# Patient Record
Sex: MALE | Race: WHITE | NOT HISPANIC OR LATINO | Employment: OTHER | URBAN - METROPOLITAN AREA
[De-identification: names, ages, dates, MRNs, and addresses within clinical notes are randomized per-mention and may not be internally consistent; named-entity substitution may affect disease eponyms.]

---

## 2017-01-31 ENCOUNTER — ALLSCRIPTS OFFICE VISIT (OUTPATIENT)
Dept: OTHER | Facility: OTHER | Age: 82
End: 2017-01-31

## 2017-02-19 ENCOUNTER — GENERIC CONVERSION - ENCOUNTER (OUTPATIENT)
Dept: OTHER | Facility: OTHER | Age: 82
End: 2017-02-19

## 2017-04-28 ENCOUNTER — ALLSCRIPTS OFFICE VISIT (OUTPATIENT)
Dept: OTHER | Facility: OTHER | Age: 82
End: 2017-04-28

## 2017-05-03 ENCOUNTER — ALLSCRIPTS OFFICE VISIT (OUTPATIENT)
Dept: OTHER | Facility: OTHER | Age: 82
End: 2017-05-03

## 2017-08-04 ENCOUNTER — ALLSCRIPTS OFFICE VISIT (OUTPATIENT)
Dept: OTHER | Facility: OTHER | Age: 82
End: 2017-08-04

## 2017-10-30 ENCOUNTER — ALLSCRIPTS OFFICE VISIT (OUTPATIENT)
Dept: OTHER | Facility: OTHER | Age: 82
End: 2017-10-30

## 2017-10-31 NOTE — PROGRESS NOTES
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)   4  Paroxysmal atrial fibrillation (427 31) (I48 0)   5  SVT (supraventricular tachycardia) (427 89) (I47 1)   6  Dizziness (780 4) (R42)    Plan  Hypercholesterolemia    · Simvastatin 20 MG Oral Tablet; TAKE 1 TABLET DAILY AT BEDTIME   Rx By: Cayla Ramos; Dispense: 90 Days ; #:90 Tablet; Refill: 3;For: Hypercholesterolemia; MARTELL = N; Sent To: [facility] Hypercholesterolemia, Hypertension, SVT (supraventricular tachycardia)    · Follow-up visit in 6 months Evaluation and Treatment  Follow-up  Status: Hold For -  Scheduling  Requested for: 30Apr2018   Ordered; For: Hypercholesterolemia, Hypertension, SVT (supraventricular tachycardia); Ordered By: Cayla Ramos Performed:  Due: 38FCX0268    Discussion/Summary  Cardiology Discussion Summary Free Text Note Form Marton Halsted: It is my impression that the patient has vague lightheadedness  His last interrogation in August did not suggest much in the way of prolonged SVT to explain this  His blood pressure today was quite good but he has had some higher readings at home  It would be hard to implicate low blood pressure to cause this symptom  At this point I did not alter his antihypertensive regimen since his blood pressure was fairly favorable today he will continue low-dose metoprolol along with Cardura 2 mg daily  He does have an upcoming interrogation and we will see if he is having prolonged episodes of tachycardia  If this would be the case I might consider switching his metoprolol to diltiazem which may be a better drug for the SVT  Gilles Nance His valves appear to be working fine and his exam is unremarkable  He is not having much in the way of atrial fibrillation status post pulmonary vein isolation  He also has LA ligation  He is taking aspirin since he had some issues with warfarin and excessive bruising   His pacemaker appears to be functioning fine  His lipids were reviewed and were excellent earlier this year  I will see him again in 6 months time  Chief Complaint  Chief Complaint Free Text Note Form: 6 month FU for AS/MR and thoracic aortic aneurysm status post AVR/MVR and aortic root surgery in February of 2013  Sara Taylor Also has PAF-not on warfarin, hypertension, hyperlipidemia, SVT and permanent pacemaker in situ for sick sinus syndrome   Chief Complaint Chronic Condition [facility]: Patient is here today for follow up of chronic conditions described in HPI  History of Present Illness  Cardiology HPI Free Text Note Form [facility]: The patient has had some elevated BPs lately  He is having HAs  He feels unsteady at times which he attributes to [facility]    ON interrogations (last in August) he had only brief SVT  He denies shortness of breath or chest pain  He denies syncope  He does note slight LE edema  He denies palpitations  Review of Systems  Cardiology Male ROS:     Cardiac: rhythm problems-- and-- has swelling in the LEs, but-- no chest pain,-- no fainting/blackouts,-- no heart murmur present,-- no palpitations present,-- no syncope/fainting,-- no AM fatigue-- and-- no witnessed apnea episodes  Skin: No complaints of nonhealing sores or skin rash  Genitourinary: frequent urination at night, but-- no recurrent urinary tract infections,-- no difficult urination,-- no blood in urine,-- no kidney stones,-- no loss of bladder control,-- no kidney problems,-- no prostate problems-- and-- no erectile dysfunction   Psychological: depression,-- anxiety-- and-- difficulty concentrating, but-- no panic attacks-- and-- no palpitations present  General: lack of energy/fatigue, but-- no trouble sleeping,-- no appetite changes,-- no changes in weight,-- no fever,-- no night sweats-- and-- no frequent infections  Respiratory: No complaints of shortness of breath, cough with sputum, or wheezing     HEENT: no serious eye problems,-- no throat problems-- and-- no snoring   Gastrointestinal: No complaints of liver problems, nausea, vomiting, heartburn, constipation, bloody stools, diarrhea, problems swallowing, adbominal pain, or rectal bleeding  Hematologic: No complaints of bleeding disorders, anemia, blood clots, or excessive brusing  Neurological: headaches-- and-- dizziness, but-- no numbness,-- no tingling,-- no weakness,-- no seizures,-- no diplopia-- and-- no daytime sleepiness-- lightheadedness  Musculoskeletal: arthritis-- and-- back pain, but-- no swelling/pain   ROS Reviewed:   ROS reviewed  Active Problems  Problems    1  Anticoagulant long-term use (V58 61) (Z79 01)   2  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)   3  Aortic aneurysm (441 9) (I71 9)   4  Aortic Valve Replacement   5  Benign prostatic hypertrophy (600 00) (N40 0)   6  Esophageal reflux (530 81) (K21 9)   7  Hypercholesterolemia (272 0) (E78 00)   8  Hypertension (401 9) (I10)   9  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)   10  Mitral regurgitation (424 0) (I34 0)   11  Pancreatic cyst (577 2) (K86 2)   12  Paroxysmal atrial fibrillation (427 31) (I48 0)   13  S/P mitral valve replacement (V43 3) (Z95 2)   14  SVT (supraventricular tachycardia) (427 89) (I47 1)   15  Valvular Heart Disease (396 9)    Past Medical History  Problems    1  History of atrial fibrillation (V12 59) (Z86 79)   2  History of gastroesophageal reflux (GERD) (V12 79) (Z87 19)   3  History of hiatal hernia (V12 79) (Z87 19)   4  History of hypercholesterolemia (V12 29) (Z86 39)   5  History of hypertension (V12 59) (Z86 79)   6  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)  Active Problems And Past Medical History Reviewed: The active problems and past medical history were reviewed and updated today  Surgical History  Problems    1  Aortic Valve Replacement   2  History of Ascending Aorta Graft With Valve Replacement   3   History of Cataract Surgery   4  History of Excision Of Lesion Ears Malignant   5  History of Mitral Valve Replacement   6  History of Pacemaker Placement   7  History of Shoulder Repair  Surgical History Reviewed: The surgical history was reviewed and updated today  Family History  Mother    1  No pertinent family history  Father    2  No pertinent family history  Family History    3  Family history of Acute Myocardial Infarction (V17 3)   4  Family history of Heart Disease (V17 49)  Family History Reviewed: The family history was reviewed and updated today  Social History  Problems    · Denied: History of Alcohol Use (History)   · Denied: History of Drug Use   · Former smoker (R44 92) (J34 533)  Social History Reviewed: The social history was reviewed and updated today  The social history was reviewed and is unchanged  Current Meds   1  Allopurinol 300 MG Oral Tablet; 1 tablet 3 times weekly; Therapy: (Recorded:28Apr2017) to Recorded   2  Aspirin EC Low Dose 81 MG Oral Tablet Delayed Release; Take 1 tablet daily Recorded   3  Cardura 4 MG Oral Tablet; TAKE 1/2 TABLET DAILY; Therapy: (Recorded:06Pti1055) to Recorded   4  Cosamin -400 MG Oral Tablet; TAKE AS DIRECTED; Therapy: (Recorded:10Ybl5739) to Recorded   5  Fish Oil 1000 MG Oral Capsule; Take 1 capsule twice daily; Therapy: (Joseline Draft) to Recorded   6  LORazepam 0 5 MG Oral Tablet; TAKE 1 TABLET TWICE DAILY; Therapy: 13NJD9303 to (Evaluate:18Feb2017); Last Rx:21Oct2016 Ordered   7  Metoprolol Succinate ER 25 MG Oral Tablet Extended Release 24 Hour; Take 1 tablet   twice daily  Requested for: 75Abl4072; Last Rx:31Plb6468 Ordered   8  Multi-Vitamin TABS; TAKE 1 TABLET DAILY; Therapy: (Joseline Draft) to Recorded   9  Pepcid AC 10 MG Oral Tablet; Take 1 tablet twice daily Recorded   10  Simvastatin 20 MG Oral Tablet; TAKE 1 TABLET DAILY AT BEDTIME;     Therapy: 10Bhx2916 to (Evaluate:19Gnd8894)  Requested for: 01Fjk2360; Last    Rx:46Wlp2926 Ordered  Medication List Reviewed: The medication list was reviewed and updated today  Allergies  Medication    1  No Known Drug Allergies    Vitals  Vital Signs    Recorded: 03JAF1171 08:46AM   Heart Rate 84   Systolic 007   Diastolic 80   Weight 529 lb 8 oz   BMI Calculated 25 58   BSA Calculated 1 81     Physical Exam    Constitutional   General appearance: No acute distress, well appearing and well nourished  Eyes   Conjunctiva and Sclera examination: Conjunctiva pink, sclera anicteric  Ears, Nose, Mouth, and Throat - Oropharynx: Clear, nares are clear, mucous membranes are moist    Neck   Neck and thyroid: Normal, supple, trachea midline, no thyromegaly  Pulmonary   Auscultation of lungs: Clear to auscultation, no rales, no rhonchi, no wheezing, good air movement  Cardiovascular   Auscultation of heart: Abnormal  -- Regular with premature beats  Grade 1-2 systolic murmur at base   no diastolic murmur rub or gallop  Carotid pulses: Normal, 2+ bilaterally  Pedal pulses: Normal, 2+ bilaterally  Examination of extremities for edema and/or varicosities: Normal     Abdomen   Abdomen: Non-tender and no distention  Liver and spleen: No hepatomegaly or splenomegaly  Musculoskeletal Gait and station: Normal gait  -- Digits and nails: Normal without clubbing or cyanosis  -- Inspection/palpation of joints, bones, and muscles: Abnormal -- kyphosis  Skin - Skin and subcutaneous tissue: Normal without rashes or lesions  Skin is warm and well perfused, normal turgor  Neurologic - Cranial nerves: II - XII intact  -- [place]  -- Sensation: No sensory loss      Psychiatric - Orientation to person, place, and time: Normal -- Mood and affect: Normal       Future Appointments    Date/Time Provider Specialty Site   11/07/2017 01:00 PM Cardiology, [employer] [place]   02/08/2018 08:00 AM [address]iology[address] [place] 05/09/2018 09:30 AM Cardiology, Device Remote   [place]     Signatures   Electronically signed by : FABRICE Mauricio ; Oct 30 2017  9:34AM EST                       (Author)

## 2017-11-07 ENCOUNTER — ALLSCRIPTS OFFICE VISIT (OUTPATIENT)
Dept: OTHER | Facility: OTHER | Age: 82
End: 2017-11-07

## 2018-01-13 VITALS
DIASTOLIC BLOOD PRESSURE: 60 MMHG | RESPIRATION RATE: 16 BRPM | WEIGHT: 160 LBS | HEART RATE: 78 BPM | BODY MASS INDEX: 25.71 KG/M2 | HEIGHT: 66 IN | SYSTOLIC BLOOD PRESSURE: 132 MMHG

## 2018-01-14 VITALS
HEART RATE: 84 BPM | BODY MASS INDEX: 25.58 KG/M2 | WEIGHT: 158.5 LBS | SYSTOLIC BLOOD PRESSURE: 142 MMHG | DIASTOLIC BLOOD PRESSURE: 80 MMHG

## 2018-01-16 NOTE — RESULT NOTES
Message  Patient call back with results of pro-BNP  Levels  Do not suggest overt CHF  Recent device check, as well as recent echocardiogram within normal limits  Suspect pulmonary origin and patient does admit that symptoms are similar to previous bronchitis  Patient states he will be seen primary care physician tomorrow and I advised him to have him evaluate his lungs and possibly obtain a chest x-ray  No change in medication currently   Patient will call if symptoms persist or worsen, but do not appear to be cardiac at this time      Signatures   Electronically signed by : Virginia Kidd DO; Aug 18 2016  4:28PM EST                       (Author)

## 2018-02-08 ENCOUNTER — CLINICAL SUPPORT (OUTPATIENT)
Dept: CARDIOLOGY CLINIC | Facility: CLINIC | Age: 83
End: 2018-02-08
Payer: COMMERCIAL

## 2018-02-08 DIAGNOSIS — Z95.0 PRESENCE OF PERMANENT CARDIAC PACEMAKER: ICD-10-CM

## 2018-02-08 DIAGNOSIS — I49.5 SSS (SICK SINUS SYNDROME) (HCC): ICD-10-CM

## 2018-02-08 DIAGNOSIS — I48.0 PAROXYSMAL ATRIAL FIBRILLATION (HCC): Primary | ICD-10-CM

## 2018-02-08 PROCEDURE — 93296 REM INTERROG EVL PM/IDS: CPT | Performed by: INTERNAL MEDICINE

## 2018-02-08 PROCEDURE — 93294 REM INTERROG EVL PM/LDLS PM: CPT | Performed by: INTERNAL MEDICINE

## 2018-02-08 NOTE — PROGRESS NOTES
[facility] TRANSMISSION:  BATTERY VOLTAGE ADEQUATE (9 YR)   AP <1%  <1%   ALL LEAD PARAMETERS WITHIN NORMAL LIMITS   2 VHR EPISODES WITH 1 EGM SHOWING PAT (7 @ 187 BPM), AND 1 EGM SHOWING NSVT (6 @ 194 BPM)   2 AMS EPISODES FOR 4 SEC  OF PAT   NORMAL DEVICE FUNCTION   RG     No current outpatient prescriptions on file  Study date:  21-Oct-2015     Patient: Koffi Maldonado  MR number: [id number]  Account number: [de-identified]  : 09-Aug-1926  Age: 80 years  Gender: Male  Status: Outpatient  Location: [facility] [place]  Height: 66 in  Weight: 159 7 lb  BP: 130/ 82 mmHg     Indications: Mitral valve replacement  Aortic valve replacement     Diagnoses: I08 0 - 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     Sonographer:  JULIÁN Talbert  Primary Physician:  Ming Carbajal MD  Referring Physician:  Ron Toth DO  Group:  [facility]  Interpreting Physician:  Ron Toth DO     SUMMARY     LEFT VENTRICLE:  The cavity was small  Systolic function was normal  Ejection fraction was estimated in the range of  60 % to 65 %  There were no regional wall motion abnormalities  Wall thickness was moderately increased  There was moderate concentric hypertrophy  Doppler parameters were consistent with abnormal left ventricular relaxation  (grade 1 diastolic dysfunction)      VENTRICULAR SEPTUM:  There was paradoxical motion  These changes are consistent with a  post-thoracotomy state

## 2018-03-07 NOTE — PROGRESS NOTES
"  Results/Data  Cardiac Device In Clinic [id number] 07:12PM Tien Bibi     Test Name Result Flag Reference   MISCELLANEOUS COMMENT (Report)     DEVICE INTERROGATED IN THE [place] OFFICE; BATTERY VOLTAGE ADEQUATE (9 YRS); AP = <1%,  = 1 2%; (3) AHR EPISODES NOTED WITH LONGEST DURATION @ 6 SECS - EGRMS APPEAR TO BE PAT (NOT AF); (1) VHR EPISODE ALSO NOTED FOR SVT (1:1) CONDUCTION WITH MORPHOLOGY SIMILAR TO INTRINSIC- DURATION @ 12 BEATS/AVG  MS; HX OF SAME; PT TAKES ASA 81, METOPROLOL SUCC; PMT ALSO NOTED - NO EGRMS STORED; UNABLE TO REPRODUCE TODAY; ALL LEAD PARAMETERS TEST WITHIN NORMAL LIMITS/STABLE; NO PROGRAMMING CHANGES MADE TO DEVICE PARAMETERS; NORMAL DEVICE FUNCTION  eb   Cardiac Electrophysiology Report      kfirzpu[id number](R)[id number]  pdf   DEVICE TYPE Pacemaker       Cardiac Electrophysiology Report [id number] 07:12PM Tien Bibi     Test Name Result Flag Reference   Cardiac Electrophysiology Report      [id number] pdf     Signatures   Electronically signed by : Merced Burkitt, ; Aug  2 2016  3:37PM EST                       (Author)    Electronically signed by : Dalia Orozco DO; Aug  2 2016  3:48PM EST                       (Author)    "

## 2018-03-07 NOTE — PROGRESS NOTES
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     retained EF  on beta blockers  age 80    conservative treatment unless significant symptoms  Results/Data  Cardiac Device Remote [id number] 09:30AM [place] [employer]     Test Name Result Flag Reference   MISCELLANEOUS COMMENT      [facility] TRANSMISSION: BATTERY VOLTAGE ADEQUATE (9-10 5 YRS)  AP<1%, <1%  ALL AVAILABLE LEAD PARAMETERS WITHIN NORMAL LIMITS  1 VHR EPISODE- PAT ON EGM  PT ON ASA 81MG & METOPROLOL  NORMAL DEVICE FUNCTION  GV   Cardiac Electrophysiology Report      ctvfmky[id number]dff86iyr696c22w7oilrgrt  pdf   DEVICE TYPE Pacemaker       Cardiac Electrophysiology Report [id number] 09:30AM [place] [employer]     Test Name Result Flag Reference   Cardiac Electrophysiology Report      gllwqui[id number]  pdf     Signatures   Electronically signed by : Gillian Ayala RN; May  3 2017 12:57PM EST                       (Author)    Electronically signed by : FABRICE Skinner ; May  6 2017  6:03PM EST                       (Author)    "

## 2018-03-07 NOTE — PROGRESS NOTES
"  Discussion/Summary  Normal device function     Brief AT  very brief NSVT (4 beats)  on beta blockers  Results/Data  Cardiac Device Remote [id number] 08:54AM [facility]ought     Test Name Result Flag Reference   MISCELLANEOUS COMMENT (Report)     [facility] TRANSMISSION: BATTERY STATUS "OK"  AP 0%  0%  1 AMS NOTED, 6 SECS  EGRAMS SHOW AT-NO AFIB  2 VHRS NOTED  EGRAMS PRESENT AS SVT VS NSVT  PT ON METO SUCC, ASA, & EF 60% (2015)  ALL AVAILABLE LEAD PARAMETERS WITHIN NORMAL LIMITS  NORMAL DEVICE FUNCTION  NC   Cardiac Electrophysiology Report      ztmtndf[id number]  pdf   DEVICE TYPE Pacemaker       Cardiac Electrophysiology Report [id number] 08:54AM [facility]     Test Name Result Flag Reference   Cardiac Electrophysiology Report      [id number]  pdf     Signatures   Electronically signed by : Paolo Sandoval, ; Feb 14 2017 12:57PM EST                       (Author)    Electronically signed by : FABRICE Chaudhari ; Feb 19 2017  5:47PM EST                       (Author)    "

## 2018-03-07 NOTE — PROGRESS NOTES
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     retained EF  on beta blockers  age 80    conservative treatment unless significant symptoms  Results/Data  Cardiac Device Remote [id number] 03:00PM [facility]     Test Name Result Flag Reference   MISCELLANEOUS COMMENT (Report)     [facility] TRANSMISSION: *N/B - ALERT FOR VHR EPISODES*X0A2 VHR EPISODES NOTED WITH MOST RECENT FOR NSVT @ 17 BEATS/AVG  MS; OTHER EPISODE WITH >25 BEAT DURATION & TERMINATION OF EPISODE NOT DOCUMENTED/AVG  MS; EF = 60% (2015 ECHO); PT TAKES ASA, METOPROLOL SUCC; PREV  PT OF DR RIZVI - TASK TO DR José Carmen FOR REVIEW  eb X0A* BATTERY STATUS "OK"; AP = 1%,  = 1%; ALL AVAILABLE LEAD PARAMETERS WITHIN NORMAL LIMITS; PACEMAKER FUNCTIONING APPROPRIATELY  eb   Cardiac Electrophysiology Report      [id number]  merlin  2 20 17  pdf   DEVICE TYPE Pacemaker       Cardiac Electrophysiology Report [id number] 03:00PM [facility]     Test Name Result Flag Reference   Cardiac Electrophysiology Report      [id number]  pdf     Signatures   Electronically signed by : Clementine Iglesias, ; Feb 21 2017  9:42AM EST                       (Author)    Electronically signed by : FABRICE Davila ; Mar  1 2017  6:39AM EST                       (Author)    "

## 2018-03-07 NOTE — PROGRESS NOTES
"  Discussion/Summary  Normal device function     PAT  SVT  Results/Data  Cardiac Device In Clinic [id number] 03:14PM Ashley Faes     Test Name Result Flag Reference   MISCELLANEOUS COMMENT (Report)     DEVICE INTERROGATED IN THE [place] OFFICE  BATTERY VOLTAGE ADEQUATE (9 1 - 10 5 YRS)  ALL LEAD PARAMETERS TEST WITHIN NORMAL LIMITS & STABLE  AP- 0 27%  - 0 39 %; 7 AHR EPISODES NOTED WITH LONGEST DURATION @ 10 SECS  EGRMS STORED SHOW PAT (NOT AF)  3 VHR EPISODE ALSO NOTED FOR SVT (1:1) CONDUCTION WITH MORPHOLOGY SIMILAR TO INTRINSIC- DURATION  MOST RECENT EPISODES SHOWS 14 BEATS/AVG  MS; HX OF SAME; PT TAKES ASA 81, METOPROLOL SUCC; PMT ALSO NOTED - NO EGRMS STORED  NO PROGRAMMING CHANGES MADE TO DEVICE PARAMETERS  PACEMAKER FUNCTIONING APPROPRIATELY  eb   Cardiac Electrophysiology Report      vx[id number]([id number]  pdf   DEVICE TYPE Pacemaker       Cardiac Electrophysiology Report [id number] 03:14PM Ashley Faes     Test Name Result Flag Reference   Cardiac Electrophysiology Report      [id number]  pdf     Signatures   Electronically signed by : Lalita Sullivan, ; Aug  4 2017 11:29AM EST                       (Author)    Electronically signed by : FABRICE Stanton ; Aug  6 2017  3:05PM EST                       (Author)    "

## 2018-03-07 NOTE — PROGRESS NOTES
"  Discussion/Summary  Normal device function      Results/Data  Results   Cardiac Device Remote [id number] 08:45AM [facility] Arguello     Test Name Result Flag Reference   MISCELLANEOUS COMMENT      [facility] TRANSMISSION: BATTERY STATUS "OK"   1 7% AP 0%  1 DEVICE CLASSIFIED AHR NOTED, 2 SECS  PT ON ASA & METOPROLOL  EGM APPEARS ATACH  ALL AVAILABLE LEAD PARAMETERS WITHIN NORMAL LIMITS  NORMAL DEVICE FUNCTION  NC   Cardiac Electrophysiology Report      eaoxkjekrvf[id number]  pdf   DEVICE TYPE Pacemaker       Cardiac Electrophysiology Report [id number] 08:45AM [facility]     Test Name Result Flag Reference   Cardiac Electrophysiology Report      [id number]  pdf     Signatures   Electronically signed by : Chloe Mccloud, ; Jan 11 2016  1:28PM EST                       (Author)    Electronically signed by : FABRICE Finney ; Jan 11 2016  2:22PM EST                       (Author)    "

## 2018-03-07 NOTE — PROGRESS NOTES
"  Discussion/Summary  Normal device function      Results/Data  Cardiac Device Remote [id number] 08:24AM Cam Schwalbe     Test Name Result Flag Reference   MISCELLANEOUS COMMENT      [facility] TRANSMISSION: BATTERY STATUS "OK"  AP 0%  0%  2 AMS NOTED, LONGEST 6 SECS  PT ON ASA  EGRAMS SHOW ATACH  ALL AVAILABLE LEAD PARAMETERS WITHIN NORMAL LIMITS  NORMAL DEVICE FUNCTION  NC   Cardiac Electrophysiology Report      [id number]  pdf   DEVICE TYPE Pacemaker       Cardiac Electrophysiology Report [id number] 08:24AM Cam Schwalbe     Test Name Result Flag Reference   Cardiac Electrophysiology Report      [id number]  pdf     Signatures   Electronically signed by : Donnie Cosby, ; Nov 8 2016  3:07PM EST                       (Author)    Electronically signed by : FABRICE Albright ; Nov 8 2016  5:54PM EST                       (Author)    "

## 2018-03-07 NOTE — PROGRESS NOTES
"  Discussion/Summary  Normal device function     Brief PAT  Results/Data  Cardiac Device Remote [id number] 08:32AM [employer]     Test Name Result Flag Reference   MISCELLANEOUS COMMENT      [facility] TRANSMISSION: L1GHKGIDVR VOLTAGE ADEQUATE  (10 4 YRS)  AP 1%  1%  ALL AVAILABLE LEAD PARAMETERS WITHIN NORMAL LIMITS  2 AMS EPISODES DETECTED <10 SEC  NO SIGNIFICANT HIGH RATE EPISODES  NORMAL DEVICE FUNCTION  ---BERRY   Cardiac Electrophysiology Report      [id number]  pdf   DEVICE TYPE Pacemaker       Cardiac Electrophysiology Report [id number] 08:32AM [employer]     Test Name Result Flag Reference   Cardiac Electrophysiology Report      [id number]  pdf     Signatures   Electronically signed by : Beena Krause, ; Nov 7 2017  2:43PM EST                       (Author)    Electronically signed by : FABRICE Carter ; Nov 11 2017 10:07AM EST                       (Author)    "

## 2018-03-07 NOTE — PROGRESS NOTES
"  Discussion/Summary  Normal device function      Results/Data  Results   Cardiac Device Remote [id number] 07:18AM Carolina Diaz     Test Name Result Flag Reference   MISCELLANEOUS COMMENT      [facility] TRANSMISSION: [facility]  (8 3 YRS)  AP 1%  2%  NO SIGNIFICANT HIGH RATE EPISODES  ALL AVAILABLE LEAD PARAMETERS WITHIN NORMAL LIMITS  NORMAL DEVICE FUNCTION  ---BERRY   Cardiac Electrophysiology Report      kttmaqnvelkpm[id number]  pdf   DEVICE TYPE Pacemaker       Cardiac Electrophysiology Report [id number] 07:18AM Carolina Diaz     Test Name Result Flag Reference   Cardiac Electrophysiology Report      [id number]  pdf     Signatures   Electronically signed by : Kj Stuart, ; Apr 22 2016 12:42PM EST                       (Author)    Electronically signed by : Olesya Humphrey DO;  Apr 23 2016  5:07PM EST                       (Author)    "

## 2018-03-21 DIAGNOSIS — I10 ESSENTIAL HYPERTENSION: Primary | ICD-10-CM

## 2018-03-22 RX ORDER — METOPROLOL SUCCINATE 25 MG/1
25 TABLET, EXTENDED RELEASE ORAL 2 TIMES DAILY
Qty: 180 TABLET | Refills: 0 | Status: SHIPPED | OUTPATIENT
Start: 2018-03-22 | End: 2018-05-16 | Stop reason: SDUPTHER

## 2018-05-03 RX ORDER — SIMVASTATIN 20 MG
1 TABLET ORAL
COMMUNITY
Start: 2013-12-20 | End: 2019-01-09 | Stop reason: SDUPTHER

## 2018-05-03 RX ORDER — ASPIRIN 81 MG/1
1 TABLET ORAL DAILY
COMMUNITY
End: 2018-08-24

## 2018-05-03 RX ORDER — CHLORAL HYDRATE 500 MG
1 CAPSULE ORAL 2 TIMES DAILY
COMMUNITY

## 2018-05-03 RX ORDER — MULTIVITAMIN
1 TABLET ORAL DAILY
COMMUNITY

## 2018-05-03 RX ORDER — LANOLIN ALCOHOL/MO/W.PET/CERES
CREAM (GRAM) TOPICAL
COMMUNITY

## 2018-05-03 RX ORDER — AMOXICILLIN 500 MG/1
CAPSULE ORAL
COMMUNITY
Start: 2017-12-16

## 2018-05-03 RX ORDER — ALLOPURINOL 300 MG/1
TABLET ORAL 3 TIMES WEEKLY
COMMUNITY

## 2018-05-03 RX ORDER — LORAZEPAM 0.5 MG/1
TABLET ORAL
COMMUNITY
Start: 2017-04-01

## 2018-05-03 RX ORDER — DOXAZOSIN MESYLATE 4 MG/1
0.5 TABLET ORAL DAILY
COMMUNITY
End: 2018-05-16 | Stop reason: ALTCHOICE

## 2018-05-03 RX ORDER — FAMOTIDINE 10 MG
1 TABLET ORAL
COMMUNITY
Start: 2011-12-06

## 2018-05-03 RX ORDER — SERTRALINE HYDROCHLORIDE 25 MG/1
25 TABLET, FILM COATED ORAL
COMMUNITY
Start: 2018-01-02 | End: 2019-11-15

## 2018-05-03 RX ORDER — DOXAZOSIN 2 MG/1
1 TABLET ORAL
COMMUNITY
Start: 2017-12-15 | End: 2019-11-15

## 2018-05-16 ENCOUNTER — OFFICE VISIT (OUTPATIENT)
Dept: CARDIOLOGY CLINIC | Facility: CLINIC | Age: 83
End: 2018-05-16
Payer: COMMERCIAL

## 2018-05-16 VITALS
HEART RATE: 78 BPM | BODY MASS INDEX: 25.55 KG/M2 | RESPIRATION RATE: 16 BRPM | DIASTOLIC BLOOD PRESSURE: 64 MMHG | SYSTOLIC BLOOD PRESSURE: 146 MMHG | HEIGHT: 66 IN | WEIGHT: 159 LBS

## 2018-05-16 DIAGNOSIS — I35.9 AORTIC VALVE DISEASE: ICD-10-CM

## 2018-05-16 DIAGNOSIS — I49.5 SICK SINUS SYNDROME (HCC): ICD-10-CM

## 2018-05-16 DIAGNOSIS — I10 ESSENTIAL HYPERTENSION: ICD-10-CM

## 2018-05-16 DIAGNOSIS — I47.1 PSVT (PAROXYSMAL SUPRAVENTRICULAR TACHYCARDIA) (HCC): ICD-10-CM

## 2018-05-16 DIAGNOSIS — E78.2 MIXED HYPERLIPIDEMIA: ICD-10-CM

## 2018-05-16 DIAGNOSIS — I48.0 PAF (PAROXYSMAL ATRIAL FIBRILLATION) (HCC): Primary | ICD-10-CM

## 2018-05-16 DIAGNOSIS — I05.9 MITRAL VALVE DISEASE: ICD-10-CM

## 2018-05-16 PROBLEM — K21.9 GASTROESOPHAGEAL REFLUX DISEASE WITHOUT ESOPHAGITIS: Status: ACTIVE | Noted: 2018-05-16

## 2018-05-16 PROBLEM — N40.0 BENIGN PROSTATIC HYPERPLASIA WITHOUT LOWER URINARY TRACT SYMPTOMS: Status: ACTIVE | Noted: 2018-05-16

## 2018-05-16 PROCEDURE — 93000 ELECTROCARDIOGRAM COMPLETE: CPT | Performed by: INTERNAL MEDICINE

## 2018-05-16 PROCEDURE — 99214 OFFICE O/P EST MOD 30 MIN: CPT | Performed by: INTERNAL MEDICINE

## 2018-05-16 RX ORDER — METOPROLOL SUCCINATE 25 MG/1
25 TABLET, EXTENDED RELEASE ORAL 2 TIMES DAILY
Qty: 180 TABLET | Refills: 3 | Status: SHIPPED | OUTPATIENT
Start: 2018-05-16 | End: 2018-06-17 | Stop reason: SDUPTHER

## 2018-05-16 NOTE — PROGRESS NOTES
Cardiology Follow Up    Lizbeth Ortega  8/9/1926  [id number]  [address]  [address] [place] [id number]    Reason for visit: Six-month follow-up of aortic and mitral valve disease and thoracic aortic aneurysm status post AVR/ MVR and aortic root surgery in February 2013  Also has PAF -not on warfarin, hypertension, hyperlipidemia and SVT  Also has sick sinus syndrome with permanent pacemaker in situ    1  PAF (paroxysmal atrial fibrillation) ([facility])  POCT ECG    metoprolol succinate (TOPROL-XL) 25 mg 24 hr tablet   2  Sick sinus syndrome ([facility] )     3  Aortic valve disease     4  Mitral valve disease     5  PSVT (paroxysmal supraventricular tachycardia) ([facility])  metoprolol succinate (TOPROL-XL) 25 mg 24 hr tablet   6  Mixed hyperlipidemia     7  Essential hypertension  metoprolol succinate (TOPROL-XL) 25 mg 24 hr tablet       Interval History:  Since his last visit he does have lightheadedness  His Cardura was reduced  He denies palpitations or chest pain  He does have some mild ankle edema  He does have PENA  He feels this is about the same as before     Patient Active Problem List   Diagnosis    [facility]    Aortic valve disease    Mitral valve disease    PAF (paroxysmal atrial fibrillation) ([facility])    PSVT (paroxysmal supraventricular tachycardia) ([facility])    Benign prostatic hyperplasia without lower urinary tract symptoms    Gastroesophageal reflux disease without esophagitis     Past Medical History:   Diagnosis Date    Melanoma ([facility] )     Thoracic aortic aneurysm ([facility] )      Social History     Social History    Marital status: /Civil Union     Spouse name: N/A    Number of children: N/A    Years of education: N/A     Occupational History    Not on file       Social History Main Topics    Smoking status: Former Smoker    Smokeless tobacco: Never Used      Comment: quit in 1970s    Alcohol use No    Drug use: No    Sexual activity: Not on file     Other Topics Concern    Not on file     Social History Narrative    No narrative on file      Family History   Problem Relation Age of Onset    Heart disease Family      Past Surgical History:   Procedure Laterality Date    AORTIC VALVE REPLACEMENT      CARDIAC PACEMAKER PLACEMENT      CATARACT EXTRACTION      MITRAL VALVE REPLACEMENT      SHOULDER SURGERY      THORACIC AORTIC ANEURYSM REPAIR         Current Outpatient Prescriptions:     allopurinol (ZYLOPRIM) 300 mg tablet, Take by mouth, Disp: , Rfl:     amoxicillin (AMOXIL) 500 mg capsule, TAKE 4 CAPSULES BY MOUTH 1 HOUR PRIOR TO APPOINTMENT, Disp: , Rfl:     aspirin (ASPIRIN LOW DOSE) 81 mg EC tablet, Take 1 tablet by mouth daily, Disp: , Rfl:     doxazosin (CARDURA) 2 mg tablet, Take 1 mg by mouth daily at bedtime , Disp: , Rfl:     famotidine (PEPCID) 10 mg tablet, Take 1 tablet by mouth, Disp: , Rfl:     glucosamine-chondroitin (COSAMIN DS) 500-400 MG tablet, Take by mouth, Disp: , Rfl:     LORazepam (ATIVAN) 0 5 mg tablet, TAKE 1 TABLET TWICE A DAY, Disp: , Rfl:     metoprolol succinate (TOPROL-XL) 25 mg 24 hr tablet, Take 1 tablet (25 mg total) by mouth 2 (two) times a day, Disp: 180 tablet, Rfl: 3    Multiple Vitamin (MULTI-VITAMIN DAILY) TABS, Take 1 tablet by mouth daily, Disp: , Rfl:     Omega-3 Fatty Acids (FISH OIL) 1,000 mg, Take 1 capsule by mouth 2 (two) times a day, Disp: , Rfl:     sertraline (ZOLOFT) 25 mg tablet, Take 25 mg by mouth, Disp: , Rfl:     simvastatin (ZOCOR) 20 mg tablet, Take 1 tablet by mouth, Disp: , Rfl:   No Known Allergies    Review of Systems:  Review of Systems   Constitutional: Positive for fatigue  Negative for activity change, appetite change and unexpected weight change  Respiratory: Positive for shortness of breath  Negative for cough, chest tightness and wheezing  Cardiovascular: Positive for leg swelling   Negative for chest pain and palpitations  Gastrointestinal: Positive for constipation  Negative for abdominal distention, blood in stool and diarrhea  Genitourinary: Positive for frequency  Negative for dysuria, hematuria and urgency  Musculoskeletal: Positive for arthralgias and back pain  Negative for gait problem and joint swelling  Neurological: Positive for dizziness, light-headedness and headaches  Negative for seizures  Psychiatric/Behavioral: Negative for agitation, behavioral problems, confusion and decreased concentration  Physical Exam:  Vitals:    05/16/18 1454   BP: 146/64   BP Location: Right arm   Patient Position: Sitting   Cuff Size: Standard   Pulse: 78   Resp: 16   Weight: 72 1 kg (159 lb)   Height: 5' 6" (1 676 m)       Physical Exam   Constitutional: He is oriented to person, place, and time  He appears well-developed and well-nourished  No distress  HENT:   Head: Normocephalic and atraumatic  Mouth/Throat: No oropharyngeal exudate  Eyes: Conjunctivae are normal  No scleral icterus  Neck: Neck supple  Normal carotid pulses and no JVD present  Carotid bruit is not present  No thyromegaly present  Cardiovascular: Normal rate and regular rhythm  Exam reveals no gallop and no friction rub  Murmur heard  Crescendo decrescendo systolic (basal) murmur is present with a grade of 2/6    No diastolic murmur is present   Pulses:       Dorsalis pedis pulses are 1+ on the right side, and 1+ on the left side  Posterior tibial pulses are 1+ on the right side, and 1+ on the left side  Pulmonary/Chest: Breath sounds normal  He has no decreased breath sounds  He has no wheezes  He has no rhonchi  He has no rales  Abdominal: Soft  He exhibits no mass  There is no hepatosplenomegaly  There is no tenderness  Musculoskeletal: He exhibits deformity (kyphosis)  He exhibits no edema or tenderness  Neurological: He is oriented to person, place, and time  He has normal strength   No cranial nerve deficit or sensory deficit  Skin: Skin is warm and dry  No rash noted  No erythema  No pallor  Psychiatric: He has a normal mood and affect  His behavior is normal  Judgment and thought content normal           Discussion/Summary:  1  PAF-no evidence on pacer checks  Has some short PAT and VT  No symptoms  Continue beta blocker  2  SSS with pacer in situ- functioning normally  3 /4  Aortic and mitral valve disease status post bioprosthetic aortic and mitral valve replacement  Normal functioning prostheses by exam   5  PSVT  As noted above,   Short PAT but nothing sustained  Continue low-dose beta-blocker  6  Hyperlipidemia  LDL cholesterol 90 on simvastatin  Continue same  No CAD seen on catheterization pre surgery in 2011  7   HTN-well controlled on metoprolol    FU 6 months    Mary Watson MD

## 2018-05-29 ENCOUNTER — REMOTE DEVICE CLINIC VISIT (OUTPATIENT)
Dept: CARDIOLOGY CLINIC | Facility: CLINIC | Age: 83
End: 2018-05-29
Payer: COMMERCIAL

## 2018-05-29 DIAGNOSIS — I49.5 SICK SINUS SYNDROME (HCC): Primary | ICD-10-CM

## 2018-05-29 DIAGNOSIS — Z95.0 CARDIAC PACEMAKER: ICD-10-CM

## 2018-05-29 DIAGNOSIS — I48.0 PAROXYSMAL ATRIAL FIBRILLATION (HCC): ICD-10-CM

## 2018-05-29 DIAGNOSIS — I47.1 PSVT (PAROXYSMAL SUPRAVENTRICULAR TACHYCARDIA) (HCC): ICD-10-CM

## 2018-05-29 PROCEDURE — 93296 REM INTERROG EVL PM/IDS: CPT | Performed by: INTERNAL MEDICINE

## 2018-05-29 PROCEDURE — 93294 REM INTERROG EVL PM/LDLS PM: CPT | Performed by: INTERNAL MEDICINE

## 2018-05-29 NOTE — PROGRESS NOTES
Results for orders placed or performed in visit on 05/29/18   Cardiac EP device report    Narrative    SJM DDD PPM  MERLIN TRANSMISSION:  BATTERY VOLTAGE ADEQUATE (8-9 5 YRS)  AP <1%  <1%  ALL AVAILABLE LEAD PARAMETERS APPEAR WITHIN NORMAL LIMITS  NO HIGH RATE EPISODES  NORMAL DEVICE FUNCTION    eb

## 2018-06-17 DIAGNOSIS — I48.0 PAF (PAROXYSMAL ATRIAL FIBRILLATION) (HCC): ICD-10-CM

## 2018-06-17 DIAGNOSIS — I10 ESSENTIAL HYPERTENSION: ICD-10-CM

## 2018-06-17 DIAGNOSIS — I47.1 PSVT (PAROXYSMAL SUPRAVENTRICULAR TACHYCARDIA) (HCC): ICD-10-CM

## 2018-06-17 RX ORDER — METOPROLOL SUCCINATE 25 MG/1
TABLET, EXTENDED RELEASE ORAL
Qty: 180 TABLET | Refills: 3 | Status: SHIPPED | OUTPATIENT
Start: 2018-06-17 | End: 2018-08-07 | Stop reason: SDUPTHER

## 2018-08-03 ENCOUNTER — IN-CLINIC DEVICE VISIT (OUTPATIENT)
Dept: CARDIOLOGY CLINIC | Facility: CLINIC | Age: 83
End: 2018-08-03
Payer: COMMERCIAL

## 2018-08-03 DIAGNOSIS — Z95.0 PRESENCE OF CARDIAC PACEMAKER: ICD-10-CM

## 2018-08-03 DIAGNOSIS — I47.1 PAROXYSMAL SVT (SUPRAVENTRICULAR TACHYCARDIA) (HCC): ICD-10-CM

## 2018-08-03 DIAGNOSIS — I48.0 PAROXYSMAL ATRIAL FIBRILLATION (HCC): Primary | ICD-10-CM

## 2018-08-03 DIAGNOSIS — I48.91 ATRIAL FIBRILLATION, UNSPECIFIED TYPE (HCC): Primary | ICD-10-CM

## 2018-08-03 DIAGNOSIS — I49.5 SSS (SICK SINUS SYNDROME) (HCC): ICD-10-CM

## 2018-08-03 PROCEDURE — 93000 ELECTROCARDIOGRAM COMPLETE: CPT

## 2018-08-03 PROCEDURE — 93280 PM DEVICE PROGR EVAL DUAL: CPT | Performed by: INTERNAL MEDICINE

## 2018-08-03 NOTE — PROGRESS NOTES
Results for orders placed or performed in visit on 08/03/18   Cardiac EP device report    Narrative    SJM DDD PPM  DEVICE INTERROGATED IN THE [place] OFFICE  BATTERY VOLTAGE ADEQUATE (8 YRS)  AP: <1%  : <1%  ALL LEAD PARAMETERS WITHIN NORMAL LIMITS   2901 AMS EPISODES (CONSECUTIVE AF SINCE 08/02/18 24HRS) W/ PRESENTING RHYTHM IN AF WITH HRS -110 BPM  AT/AF BURDEN 1%  PT TAKES ASA 81MG, OFF OF COUMADIN DUE TO BRUISING  PT TAKES METOPROLOL SUCC  EF: 60-65% (ECHO 10/21/15)  SPOKE TO DR Navin Chavarria & HE WILL FURTHER DISCUSS W/ PT & WIFE  EKG OBTAINED  NO OTHER  SIGNIFICANT HIGH RATE EPISODES SINCE REMOTE ON 07/25/18  NO PROGRAMMING CHANGES MADE TO DEVICE PARAMETERS  PACEMAKER FUNCTIONING APPROPRIATELY    [facility]

## 2018-08-07 ENCOUNTER — OFFICE VISIT (OUTPATIENT)
Dept: CARDIOLOGY CLINIC | Facility: CLINIC | Age: 83
End: 2018-08-07
Payer: COMMERCIAL

## 2018-08-07 VITALS
HEART RATE: 100 BPM | SYSTOLIC BLOOD PRESSURE: 128 MMHG | DIASTOLIC BLOOD PRESSURE: 92 MMHG | WEIGHT: 161 LBS | HEIGHT: 65 IN | BODY MASS INDEX: 26.82 KG/M2

## 2018-08-07 DIAGNOSIS — I05.9 MITRAL VALVE DISEASE: ICD-10-CM

## 2018-08-07 DIAGNOSIS — I10 ESSENTIAL HYPERTENSION: ICD-10-CM

## 2018-08-07 DIAGNOSIS — I49.5 SICK SINUS SYNDROME (HCC): ICD-10-CM

## 2018-08-07 DIAGNOSIS — I48.0 PAF (PAROXYSMAL ATRIAL FIBRILLATION) (HCC): Primary | ICD-10-CM

## 2018-08-07 DIAGNOSIS — I48.92 PAROXYSMAL ATRIAL FLUTTER (HCC): ICD-10-CM

## 2018-08-07 DIAGNOSIS — I35.9 AORTIC VALVE DISEASE: ICD-10-CM

## 2018-08-07 DIAGNOSIS — I47.1 PSVT (PAROXYSMAL SUPRAVENTRICULAR TACHYCARDIA) (HCC): ICD-10-CM

## 2018-08-07 PROCEDURE — 93000 ELECTROCARDIOGRAM COMPLETE: CPT | Performed by: INTERNAL MEDICINE

## 2018-08-07 PROCEDURE — 99214 OFFICE O/P EST MOD 30 MIN: CPT | Performed by: INTERNAL MEDICINE

## 2018-08-07 RX ORDER — METOPROLOL SUCCINATE 50 MG/1
50 TABLET, EXTENDED RELEASE ORAL 2 TIMES DAILY
Qty: 180 TABLET | Refills: 3 | Status: SHIPPED | OUTPATIENT
Start: 2018-08-07 | End: 2018-08-24 | Stop reason: SDUPTHER

## 2018-08-07 RX ORDER — DIGOXIN 125 MCG
125 TABLET ORAL SEE ADMIN INSTRUCTIONS
Qty: 90 TABLET | Refills: 3 | Status: SHIPPED | OUTPATIENT
Start: 2018-08-07 | End: 2018-08-24

## 2018-08-07 NOTE — PROGRESS NOTES
Cardiology Follow Up    Nicky Dela Cruz  8/9/1926  [id number]  [address]  [address] [place] [id number] [phone]    Reason for visit: Here ahead of time for new onset of AFIB seen on interrogation last week    Also has PSVT, AV/MV disease s/p AVR/MVR in 2013, HTN and HLP    1  PAF (paroxysmal atrial fibrillation) ([facility])  POCT ECG   2  PSVT (paroxysmal supraventricular tachycardia) ([facility])  POCT ECG   3  Aortic valve disease     4  Mitral valve disease     5  Sick sinus syndrome ([facility])         Interval History: The patient had a pacer check  Last week which showed AFIB with mildly tachycardic response  He did note some exercise intolerance  He did note some palpitations later  I increased his metoprolol to 50 mg BID  He feels a bit lightheadedness  He still has exercise intolerance  He denies edema  He denies CP or SOB  Patient Active Problem List   Diagnosis    Sick sinus syndrome ([facility])    Aortic valve disease    Mitral valve disease    PAF (paroxysmal atrial fibrillation) ([facility])    PSVT (paroxysmal supraventricular tachycardia) ([facility])    Benign prostatic hyperplasia without lower urinary tract symptoms    Gastroesophageal reflux disease without esophagitis     Past Medical History:   Diagnosis Date    [facility]oma ([facility] )     [facility] ([email or web address] )      Social History     Social History    Marital status: /Civil Union     Spouse name: N/A    Number of children: N/A    Years of education: N/A     Occupational History    Not on file       Social History Main Topics    Smoking status: Former Smoker    Smokeless tobacco: Never Used      Comment: quit in 1970s    Alcohol use No    Drug use: No    Sexual activity: Not on file     Other Topics Concern    Not on file     Social History Narrative    No narrative on file      Family History   Problem Relation Age of Onset    Heart disease Family      Past Surgical History:   Procedure Laterality Date    AORTIC VALVE REPLACEMENT      CARDIAC PACEMAKER PLACEMENT      CATARACT EXTRACTION      MITRAL VALVE REPLACEMENT      SHOULDER SURGERY      THORACIC AORTIC ANEURYSM REPAIR         Current Outpatient Prescriptions:     allopurinol (ZYLOPRIM) 300 mg tablet, Take by mouth, Disp: , Rfl:     amoxicillin (AMOXIL) 500 mg capsule, TAKE 4 CAPSULES BY MOUTH 1 HOUR PRIOR TO APPOINTMENT, Disp: , Rfl:     aspirin (ASPIRIN LOW DOSE) 81 mg EC tablet, Take 1 tablet by mouth daily, Disp: , Rfl:     doxazosin (CARDURA) 2 mg tablet, Take 1 mg by mouth daily at bedtime , Disp: , Rfl:     famotidine (PEPCID) 10 mg tablet, Take 1 tablet by mouth, Disp: , Rfl:     glucosamine-chondroitin (COSAMIN DS) 500-400 MG tablet, Take by mouth, Disp: , Rfl:     LORazepam (ATIVAN) 0 5 mg tablet, TAKE 1 TABLET TWICE A DAY, Disp: , Rfl:     metoprolol succinate (TOPROL-XL) 25 mg 24 hr tablet, TAKE 1 TABLET BY MOUTH 2 (TWO) TIMES A DAY (Patient taking differently: two tablets in the am and two tablets in pm), Disp: 180 tablet, Rfl: 3    Multiple Vitamin (MULTI-VITAMIN DAILY) TABS, Take 1 tablet by mouth daily, Disp: , Rfl:     Omega-3 Fatty Acids (FISH OIL) 1,000 mg, Take 1 capsule by mouth 2 (two) times a day, Disp: , Rfl:     sertraline (ZOLOFT) 25 mg tablet, Take 25 mg by mouth, Disp: , Rfl:     simvastatin (ZOCOR) 20 mg tablet, Take 1 tablet by mouth, Disp: , Rfl:   No Known Allergies      Review of Systems:  Review of Systems   Constitutional: Positive for fatigue  Negative for activity change, appetite change and unexpected weight change  Respiratory: Negative for cough, chest tightness, shortness of breath and wheezing  Cardiovascular: Positive for palpitations  Negative for chest pain and leg swelling  Gastrointestinal: Negative for abdominal distention, blood in stool, constipation and diarrhea  Genitourinary: Positive for frequency   Negative for dysuria, hematuria and urgency  Musculoskeletal: Positive for arthralgias and back pain  Negative for gait problem and joint swelling  Neurological: Positive for dizziness and light-headedness  Negative for seizures and headaches  Psychiatric/Behavioral: Negative for agitation, behavioral problems, confusion and decreased concentration  Physical Exam:  Vitals:    08/07/18 1614   BP: 128/92   Pulse: 100   Weight: 73 kg (161 lb)   Height: 5' 5" (1 651 m)       Physical Exam   Constitutional: He is oriented to person, place, and time  He appears well-developed and well-nourished  No distress  HENT:   Head: Normocephalic and atraumatic  Mouth/Throat: No oropharyngeal exudate  Eyes: Conjunctivae are normal  No scleral icterus  Neck: Neck supple  Normal carotid pulses and no JVD present  Carotid bruit is not present  No thyromegaly present  Cardiovascular: Regular rhythm  Tachycardia present  Exam reveals no gallop and no friction rub  Murmur heard  Crescendo decrescendo systolic (basal) murmur is present with a grade of 2/6    No diastolic murmur is present   Pulses:       Dorsalis pedis pulses are 1+ on the right side, and 1+ on the left side  Posterior tibial pulses are 1+ on the right side, and 1+ on the left side  Pulmonary/Chest: Breath sounds normal  He has no decreased breath sounds  He has no wheezes  He has no rhonchi  He has no rales  Abdominal: Soft  He exhibits no mass  There is no hepatosplenomegaly  There is no tenderness  Musculoskeletal: He exhibits deformity (kyphosis)  He exhibits no edema or tenderness  Neurological: He is oriented to person, place, and time  He has normal strength  No cranial nerve deficit or sensory deficit  Skin: Skin is warm and dry  No rash noted  No erythema  No pallor  Psychiatric: He has a normal mood and affect  His behavior is normal  Judgment and thought content normal             Discussion/Summary:  1  Atrial flutter vs PAT- rate regular and 100  Lluvia Mort Patient with exercise intolerance    Will add low dose digoxin (BID for 3 days then daily)    Will run rhythm by EP to see if this is flutter or PAT  Ambar Ulisses Makes a difference re AC or not  2  PSVT hx    See above comments  3/4  AV/MV disease    AVR/MVR in 2013    No apparent valvular dysfunction  5  SSS with pacer in situ      Will check digoxin level and TSH in 2 weeks    Pt to call with update later next week to see effect of digoxin-will determine FU then        Noel Gu MD

## 2018-08-13 ENCOUNTER — TELEPHONE (OUTPATIENT)
Dept: CARDIOLOGY CLINIC | Facility: CLINIC | Age: 83
End: 2018-08-13

## 2018-08-13 NOTE — TELEPHONE ENCOUNTER
Pt's wife called office, pt has ov on 8/7/18  Pt was put on Digoxin 0 125 mg, Heart rate is still ranging from   Pt's wife Timo Prashant would like to discuss, as she did not think it would take this long for pt's HR to come down  She also has questions about EP, she would like a phone call back  Thanks

## 2018-08-13 NOTE — TELEPHONE ENCOUNTER
Phone call to wife    Too early to see slowing    Discussed need for [facility]    Will reevaluate after dig level next week    I will call and set up time to come in and start [facility] and possible need to see EP

## 2018-08-20 ENCOUNTER — APPOINTMENT (OUTPATIENT)
Dept: LAB | Facility: CLINIC | Age: 83
End: 2018-08-20
Payer: COMMERCIAL

## 2018-08-20 DIAGNOSIS — I48.92 PAROXYSMAL ATRIAL FLUTTER (HCC): ICD-10-CM

## 2018-08-20 LAB
DIGOXIN SERPL-MCNC: 0.4 NG/ML (ref 0.8–2)
TSH SERPL DL<=0.05 MIU/L-ACNC: 2.95 UIU/ML (ref 0.36–3.74)

## 2018-08-20 PROCEDURE — 84443 ASSAY THYROID STIM HORMONE: CPT

## 2018-08-20 PROCEDURE — 80162 ASSAY OF DIGOXIN TOTAL: CPT

## 2018-08-20 PROCEDURE — 36415 COLL VENOUS BLD VENIPUNCTURE: CPT

## 2018-08-21 ENCOUNTER — TELEPHONE (OUTPATIENT)
Dept: CARDIOLOGY CLINIC | Facility: CLINIC | Age: 83
End: 2018-08-21

## 2018-08-21 NOTE — TELEPHONE ENCOUNTER
Called wife    Told them he should come in at 920 on Friday for a 940 appt for atrial flutter     Increase digoxin to 0 25 mg alternating with 0 125 mg daily until seen  I will initiate Eliquis at the office visit and set him for an appointment with electrophysiology at that time    Please notify  to add appointment

## 2018-08-21 NOTE — TELEPHONE ENCOUNTER
Phone call from Mrs Cheryl Stewart requesting results of Liza Solis blood work done yesterday  Liza Solis still having rapid HR  Seen by family doctor today because of cold  HR in office 108  Lungs clear, fatigued  Pt given musinex DM  Wife questions results and what is next step      Please advise

## 2018-08-24 ENCOUNTER — OFFICE VISIT (OUTPATIENT)
Dept: CARDIOLOGY CLINIC | Facility: CLINIC | Age: 83
End: 2018-08-24
Payer: COMMERCIAL

## 2018-08-24 VITALS
RESPIRATION RATE: 12 BRPM | DIASTOLIC BLOOD PRESSURE: 72 MMHG | HEIGHT: 67 IN | BODY MASS INDEX: 24.48 KG/M2 | WEIGHT: 156 LBS | SYSTOLIC BLOOD PRESSURE: 130 MMHG | HEART RATE: 67 BPM

## 2018-08-24 DIAGNOSIS — I05.9 MITRAL VALVE DISEASE: ICD-10-CM

## 2018-08-24 DIAGNOSIS — I49.5 SICK SINUS SYNDROME (HCC): ICD-10-CM

## 2018-08-24 DIAGNOSIS — I48.92 PAROXYSMAL ATRIAL FLUTTER (HCC): Primary | ICD-10-CM

## 2018-08-24 DIAGNOSIS — J40 BRONCHITIS: ICD-10-CM

## 2018-08-24 DIAGNOSIS — I47.1 PSVT (PAROXYSMAL SUPRAVENTRICULAR TACHYCARDIA) (HCC): ICD-10-CM

## 2018-08-24 DIAGNOSIS — I35.9 AORTIC VALVE DISEASE: ICD-10-CM

## 2018-08-24 PROCEDURE — 99214 OFFICE O/P EST MOD 30 MIN: CPT | Performed by: INTERNAL MEDICINE

## 2018-08-24 PROCEDURE — 93000 ELECTROCARDIOGRAM COMPLETE: CPT | Performed by: INTERNAL MEDICINE

## 2018-08-24 RX ORDER — CEPHALEXIN 500 MG/1
500 CAPSULE ORAL EVERY 12 HOURS SCHEDULED
Qty: 10 CAPSULE | Refills: 0 | Status: SHIPPED | OUTPATIENT
Start: 2018-08-24 | End: 2018-08-29

## 2018-08-24 RX ORDER — METOPROLOL SUCCINATE 50 MG/1
25 TABLET, EXTENDED RELEASE ORAL 2 TIMES DAILY
Qty: 180 TABLET | Refills: 0
Start: 2018-08-24 | End: 2018-11-10 | Stop reason: SDUPTHER

## 2018-08-24 RX ORDER — AMIODARONE HYDROCHLORIDE 200 MG/1
200 TABLET ORAL SEE ADMIN INSTRUCTIONS
Qty: 97 TABLET | Refills: 0 | Status: SHIPPED | OUTPATIENT
Start: 2018-08-24 | End: 2018-11-08 | Stop reason: SDUPTHER

## 2018-08-24 NOTE — PROGRESS NOTES
Cardiology Follow Up    Arian Portillo  8/9/1926  [id number]  [address]  [address] [address] [phone]    Reason for visit: 2 week FU for PA flutter  Also has PSVT hx, MV/AV disease s/pAVR/MVR and Aortic root construction in 2013  Oliver Keller Also has SSS with pacer in situ    1  [facility]oxysmal [facility] ([facility] )     2  PSVT (paroxysmal supraventricular tachycardia) ([facility] )     3  Mitral valve disease     4  Sick sinus syndrome ([facility] )     5  Aortic valve disease         Interval History: Since his last visit he did develop a URI  He was bringing up phlegm that was yellow  He did not have a fever  He was weak  He did not have much SOB  He did not have orthopnea  He denies edema or palpitations  He does get some dizziness    Patient Active Problem List   Diagnosis    [facility]    Aortic valve disease    Mitral valve disease    [facility]    PSVT ([facility]oxysmal supraventricular tachycardia) [facility]    Benign prostatic hyperplasia without lower urinary tract symptoms    Gastroesophageal reflux disease without esophagitis     Past Medical History:   Diagnosis Date    Melanoma ([facility] )     Thoracic aortic aneurysm ([facility] )      Social History     Social History    Marital status: /Civil Union     Spouse name: N/A    Number of children: N/A    Years of education: N/A     Occupational History    Not on file       Social History Main Topics    Smoking status: Former Smoker    Smokeless tobacco: Never Used      Comment: quit in 1970s    Alcohol use No    Drug use: No    Sexual activity: Not on file     Other Topics Concern    Not on file     Social History Narrative    No narrative on file      Family History   Problem Relation Age of Onset    Heart disease Family      Past Surgical History:   Procedure Laterality Date    AORTIC VALVE REPLACEMENT      CARDIAC PACEMAKER PLACEMENT      CATARACT EXTRACTION      MITRAL VALVE REPLACEMENT      SHOULDER SURGERY      THORACIC AORTIC ANEURYSM REPAIR         Current Outpatient Prescriptions:     allopurinol (ZYLOPRIM) 300 mg tablet, Take by mouth, Disp: , Rfl:     amoxicillin (AMOXIL) 500 mg capsule, TAKE 4 CAPSULES BY MOUTH 1 HOUR PRIOR TO APPOINTMENT, Disp: , Rfl:     aspirin (ASPIRIN LOW DOSE) 81 mg EC tablet, Take 1 tablet by mouth daily, Disp: , Rfl:     dextromethorphan-guaifenesin (MUCINEX DM)  MG per 12 hr tablet, Take 1 tablet by mouth every 12 (twelve) hours, Disp: , Rfl:     digoxin (LANOXIN) 0 125 mg tablet, Take 1 tablet (125 mcg total) by mouth see administration instructions One tablet BID for three days then one tablet daily in PM, Disp: 90 tablet, Rfl: 3    doxazosin (CARDURA) 2 mg tablet, Take 1 mg by mouth daily at bedtime , Disp: , Rfl:     famotidine (PEPCID) 10 mg tablet, Take 1 tablet by mouth, Disp: , Rfl:     glucosamine-chondroitin (COSAMIN DS) 500-400 MG tablet, Take by mouth, Disp: , Rfl:     LORazepam (ATIVAN) 0 5 mg tablet, TAKE 1 TABLET TWICE A DAY, Disp: , Rfl:     metoprolol succinate (TOPROL-XL) 50 mg 24 hr tablet, Take 1 tablet (50 mg total) by mouth 2 (two) times a day, Disp: 180 tablet, Rfl: 3    Multiple Vitamin (MULTI-VITAMIN DAILY) TABS, Take 1 tablet by mouth daily, Disp: , Rfl:     Omega-3 Fatty Acids (FISH OIL) 1,000 mg, Take 1 capsule by mouth 2 (two) times a day, Disp: , Rfl:     sertraline (ZOLOFT) 25 mg tablet, Take 25 mg by mouth, Disp: , Rfl:     simvastatin (ZOCOR) 20 mg tablet, Take 1 tablet by mouth, Disp: , Rfl:   No Known Allergies    Review of Systems:  Review of Systems   Constitutional: Positive for appetite change, fatigue and unexpected weight change  Negative for activity change  Respiratory: Positive for cough  Negative for chest tightness and wheezing  Cardiovascular: Negative for chest pain, palpitations and leg swelling     Gastrointestinal: Negative for abdominal distention, blood in stool, constipation and diarrhea  Genitourinary: Positive for frequency  Negative for dysuria, hematuria and urgency  Musculoskeletal: Positive for arthralgias and back pain  Negative for gait problem and joint swelling  Neurological: Positive for dizziness and light-headedness  Negative for seizures and headaches  Psychiatric/Behavioral: Negative for agitation, behavioral problems, confusion and decreased concentration  Physical Exam:  Vitals:    08/24/18 0933   BP: 130/72   BP Location: Left arm   Patient Position: Sitting   Cuff Size: Adult   Pulse: 67   Resp: 12   Weight: 70 8 kg (156 lb)   Height: 5' 7" (1 702 m)       Physical Exam   Constitutional: He is oriented to person, place, and time  He appears well-developed and well-nourished  No distress  HENT:   Head: Normocephalic and atraumatic  Mouth/Throat: No oropharyngeal exudate  Eyes: Conjunctivae are normal  No scleral icterus  Neck: Neck supple  Normal carotid pulses and no JVD present  Carotid bruit is not present  No thyromegaly present  Cardiovascular: Regular rhythm  Tachycardia present  Exam reveals no gallop and no friction rub  Murmur heard  Crescendo decrescendo systolic (basal) murmur is present with a grade of 2/6    No diastolic murmur is present   Pulses:       Dorsalis pedis pulses are 1+ on the right side, and 1+ on the left side  Posterior tibial pulses are 1+ on the right side, and 1+ on the left side  Pulmonary/Chest: Breath sounds normal  He has no decreased breath sounds  He has no wheezes  He has no rhonchi  He has no rales  Abdominal: Soft  He exhibits no mass  There is no hepatosplenomegaly  There is no tenderness  Musculoskeletal: He exhibits deformity (kyphosis)  He exhibits no edema or tenderness  Neurological: He is oriented to person, place, and time  He has normal strength  No cranial nerve deficit or sensory deficit  Skin: Skin is warm and dry  No rash noted  No erythema  No pallor  Psychiatric: He has a normal mood and affect  His behavior is normal  Judgment and thought content normal        Discussion/Summary:  1  PA flutter-Now in normal sinus rhythm  Will initiate amiodarone at 200 mg twice daily and reduce metoprolol to 25 mg b i d     Will stop digoxin  We did discuss the side effects of amiodarone  Eventually we would like to have Trudy Graham on 100 mg daily  Will reduce amiodarone to 200 mg once daily after 1 week  Will see patient in 2-3 weeks time to check an EKG  I have stopped aspirin and initiated Eliquis at 5 mg twice daily  We will continue this at least for 1 months time  2  PSVT  Not evident recently  3  Mitral valve disease -status post mitral valve replacement in 2013  Normally functioning prosthesis  4   Aortic valve disease status post aortic valve replacement in 2013  Normally functioning prosthesis  5  Sick sinus syndrome with pacemaker in situ  This gives us the option for AV isreal ablation if rate control becomes difficult  Hopefully patient maintain sinus rhythm on amiodarone  Discussed with Dr Alexy Barber and he did feel that perhaps if needed an atrial flutter ablation could be performed and if this was a difficult procedure av isreal ablation could be done at that time  At this time will hold off on that however  6  Bronchitis  Will prescribe cephalexin 500 mg b i d   For an additional 5     FU 3 weeks      Annalise Holland MD

## 2018-09-05 PROBLEM — I48.92 PAROXYSMAL ATRIAL FLUTTER (HCC): Status: ACTIVE | Noted: 2018-05-16

## 2018-09-06 ENCOUNTER — OFFICE VISIT (OUTPATIENT)
Dept: CARDIOLOGY CLINIC | Facility: CLINIC | Age: 83
End: 2018-09-06
Payer: COMMERCIAL

## 2018-09-06 VITALS
HEART RATE: 88 BPM | HEIGHT: 66 IN | SYSTOLIC BLOOD PRESSURE: 138 MMHG | WEIGHT: 154.6 LBS | BODY MASS INDEX: 24.85 KG/M2 | RESPIRATION RATE: 16 BRPM | DIASTOLIC BLOOD PRESSURE: 76 MMHG

## 2018-09-06 DIAGNOSIS — R53.83 OTHER FATIGUE: ICD-10-CM

## 2018-09-06 DIAGNOSIS — R06.00 DOE (DYSPNEA ON EXERTION): ICD-10-CM

## 2018-09-06 DIAGNOSIS — I05.9 MITRAL VALVE DISEASE: ICD-10-CM

## 2018-09-06 DIAGNOSIS — I35.9 AORTIC VALVE DISEASE: ICD-10-CM

## 2018-09-06 DIAGNOSIS — I49.5 SICK SINUS SYNDROME (HCC): ICD-10-CM

## 2018-09-06 DIAGNOSIS — I48.92 PAROXYSMAL ATRIAL FLUTTER (HCC): Primary | ICD-10-CM

## 2018-09-06 PROCEDURE — 93000 ELECTROCARDIOGRAM COMPLETE: CPT | Performed by: INTERNAL MEDICINE

## 2018-09-06 PROCEDURE — 99214 OFFICE O/P EST MOD 30 MIN: CPT | Performed by: INTERNAL MEDICINE

## 2018-09-06 NOTE — PROGRESS NOTES
Cardiology Follow Up    Rajiv Sheets  8/9/1926  [id number]  [address]  [address] [address] [phone]    Reason for visit: 2 week FU for PA flutter  He has known AV/MV disease s/p bio AVR/MVR in 2013  He also has SSS with pacer in situ    Also has HTN and HLP and PSVT hx      1  Paroxysmal atrial flutter (HCC)  POCT ECG   2  Aortic valve disease     3  Mitral valve disease     4  Sick sinus syndrome (HCC)         Interval History: Since the patients last visit he did have a normal HR until last nite when it jumped into 90s  He denies palpitations  He does have some PENA  He is weak but still getting over bronchitis  He denies edema  He is somewhat lightheaded  He has lost wt and and not been eating well  He denies CP    Patient Active Problem List   Diagnosis    Sick sinus syndrome (HCC)    Aortic valve disease    Mitral valve disease    Paroxysmal atrial flutter (HCC)    [facility]    Benign prostatic hyperplasia without lower urinary tract symptoms    Gastroesophageal reflux disease without esophagitis     Past Medical History:   Diagnosis Date    [facility]anoma ([facility] [address] )     Thoracic aortic aneurysm ([facility] )      Social History     Social History    Marital status: /Civil Union     Spouse name: N/A    Number of children: N/A    Years of education: N/A     Occupational History    Not on file       Social History Main Topics    Smoking status: Former Smoker    Smokeless tobacco: Never Used      Comment: quit in 1970s    Alcohol use No    Drug use: No    Sexual activity: Not on file     Other Topics Concern    Not on file     Social History Narrative    No narrative on file      Family History   Problem Relation Age of Onset    Heart disease Family      Past Surgical History:   Procedure Laterality Date    AORTIC VALVE REPLACEMENT      CARDIAC PACEMAKER PLACEMENT      CATARACT EXTRACTION      MITRAL VALVE REPLACEMENT      SHOULDER SURGERY      THORACIC AORTIC ANEURYSM REPAIR         Current Outpatient Prescriptions:     allopurinol (ZYLOPRIM) 300 mg tablet, Take by mouth, Disp: , Rfl:     amiodarone 200 mg tablet, Take 1 tablet (200 mg total) by mouth see administration instructions One tablet BID for one week then one tablet daily, Disp: 97 tablet, Rfl: 0    amoxicillin (AMOXIL) 500 mg capsule, TAKE 4 CAPSULES BY MOUTH 1 HOUR PRIOR TO APPOINTMENT, Disp: , Rfl:     apixaban (ELIQUIS) 5 mg, Take 1 tablet (5 mg total) by mouth 2 (two) times a day, Disp: 60 tablet, Rfl: 0    doxazosin (CARDURA) 2 mg tablet, Take 1 mg by mouth daily at bedtime , Disp: , Rfl:     famotidine (PEPCID) 10 mg tablet, Take 1 tablet by mouth, Disp: , Rfl:     Fluticasone Propionate, Inhal, (FLOVENT DISKUS IN), Inhale, Disp: , Rfl:     glucosamine-chondroitin (COSAMIN DS) 500-400 MG tablet, Take by mouth, Disp: , Rfl:     LORazepam (ATIVAN) 0 5 mg tablet, TAKE 1 TABLET TWICE A DAY, Disp: , Rfl:     metoprolol succinate (TOPROL-XL) 50 mg 24 hr tablet, Take 0 5 tablets (25 mg total) by mouth 2 (two) times a day, Disp: 180 tablet, Rfl: 0    Multiple Vitamin (MULTI-VITAMIN DAILY) TABS, Take 1 tablet by mouth daily, Disp: , Rfl:     Omega-3 Fatty Acids (FISH OIL) 1,000 mg, Take 1 capsule by mouth 2 (two) times a day, Disp: , Rfl:     sertraline (ZOLOFT) 25 mg tablet, Take 25 mg by mouth, Disp: , Rfl:     simvastatin (ZOCOR) 20 mg tablet, Take 1 tablet by mouth, Disp: , Rfl:   No Known Allergies    Review of Systems:  Review of Systems   Constitutional: Positive for appetite change, fatigue and unexpected weight change  Negative for activity change  Respiratory: Positive for cough (improving) and shortness of breath  Negative for chest tightness and wheezing  Cardiovascular: Negative for chest pain, palpitations and leg swelling     Gastrointestinal: Negative for abdominal distention, blood in stool, constipation and diarrhea  Genitourinary: Positive for frequency  Negative for dysuria, hematuria and urgency  Musculoskeletal: Positive for arthralgias and back pain  Negative for gait problem and joint swelling  Neurological: Positive for dizziness and light-headedness  Negative for seizures and headaches  Psychiatric/Behavioral: Negative for agitation, behavioral problems, confusion and decreased concentration  Physical Exam:  Vitals:    09/06/18 0751   BP: 138/76   Pulse: 88   Resp: 16   Weight: 70 1 kg (154 lb 9 6 oz)   Height: 5' 6" (1 676 m)       Physical Exam   Constitutional: He is oriented to person, place, and time  He appears well-developed and well-nourished  HENT:   Head: Normocephalic and atraumatic  Mouth/Throat: No oropharyngeal exudate  Eyes: Conjunctivae are normal  No scleral icterus  Neck: Neck supple  Normal carotid pulses and no JVD present  Carotid bruit is not present  No thyromegaly present  Cardiovascular: Regular rhythm  Tachycardia present  Exam reveals no gallop and no friction rub  Murmur heard  Crescendo decrescendo systolic (basal) murmur is present with a grade of 2/6    No diastolic murmur is present   Pulses:       Dorsalis pedis pulses are 1+ on the right side, and 1+ on the left side  Posterior tibial pulses are 1+ on the right side, and 1+ on the left side  Pulmonary/Chest: Breath sounds normal  He has no decreased breath sounds  He has no wheezes  He has no rhonchi  He has no rales  Abdominal: Soft  He exhibits no mass  There is no hepatosplenomegaly  There is no tenderness  Musculoskeletal: He exhibits deformity (kyphosis)  He exhibits no edema or tenderness  Neurological: He is oriented to person, place, and time  He has normal strength  No cranial nerve deficit or sensory deficit  Skin: Skin is warm and dry  No rash noted  No erythema  No pallor  Psychiatric: He has a normal mood and affect   His behavior is normal  Judgment and thought content normal           Discussion/Summary:  1  PA flutter    Rate ok but may be back in flutter with rate controlled at this time  Continue amiodarone at 200 mg daily  Continue metoprolol at 25 mg BID  On Eliquis    Will reduce to 2 5 mg BID in light of concurrent amiodarone RX      2/3  AV/MV disease  Normal functioning of the valves on exam    4  SSS with pacer in situ  5  Fatigue    Suspect unrelated to cardiac issues    Has protracted bronchitic illness  CXR ok    Will check CBC, BMP and probnp but doubt CHF  6   PENA-check BNP and CBC      FU one month    Marisol Patel MD

## 2018-09-07 ENCOUNTER — APPOINTMENT (OUTPATIENT)
Dept: LAB | Facility: CLINIC | Age: 83
End: 2018-09-07
Payer: COMMERCIAL

## 2018-09-07 DIAGNOSIS — R06.00 DOE (DYSPNEA ON EXERTION): ICD-10-CM

## 2018-09-07 DIAGNOSIS — R53.83 OTHER FATIGUE: ICD-10-CM

## 2018-09-07 LAB
ANION GAP SERPL CALCULATED.3IONS-SCNC: 6 MMOL/L (ref 4–13)
BASOPHILS # BLD AUTO: 0.08 THOUSANDS/ΜL (ref 0–0.1)
BASOPHILS NFR BLD AUTO: 1 % (ref 0–1)
BUN SERPL-MCNC: 26 MG/DL (ref 5–25)
CALCIUM SERPL-MCNC: 9.1 MG/DL (ref 8.3–10.1)
CHLORIDE SERPL-SCNC: 103 MMOL/L (ref 100–108)
CO2 SERPL-SCNC: 30 MMOL/L (ref 21–32)
CREAT SERPL-MCNC: 1.41 MG/DL (ref 0.6–1.3)
EOSINOPHIL # BLD AUTO: 0.34 THOUSAND/ΜL (ref 0–0.61)
EOSINOPHIL NFR BLD AUTO: 3 % (ref 0–6)
ERYTHROCYTE [DISTWIDTH] IN BLOOD BY AUTOMATED COUNT: 18 % (ref 11.6–15.1)
GFR SERPL CREATININE-BSD FRML MDRD: 43 ML/MIN/1.73SQ M
GLUCOSE SERPL-MCNC: 132 MG/DL (ref 65–140)
HCT VFR BLD AUTO: 40 % (ref 36.5–49.3)
HGB BLD-MCNC: 11.9 G/DL (ref 12–17)
IMM GRANULOCYTES # BLD AUTO: 0.09 THOUSAND/UL (ref 0–0.2)
IMM GRANULOCYTES NFR BLD AUTO: 1 % (ref 0–2)
LYMPHOCYTES # BLD AUTO: 1.74 THOUSANDS/ΜL (ref 0.6–4.47)
LYMPHOCYTES NFR BLD AUTO: 15 % (ref 14–44)
MCH RBC QN AUTO: 19.9 PG (ref 26.8–34.3)
MCHC RBC AUTO-ENTMCNC: 29.8 G/DL (ref 31.4–37.4)
MCV RBC AUTO: 67 FL (ref 82–98)
MONOCYTES # BLD AUTO: 1.04 THOUSAND/ΜL (ref 0.17–1.22)
MONOCYTES NFR BLD AUTO: 9 % (ref 4–12)
NEUTROPHILS # BLD AUTO: 8.46 THOUSANDS/ΜL (ref 1.85–7.62)
NEUTS SEG NFR BLD AUTO: 71 % (ref 43–75)
NRBC BLD AUTO-RTO: 0 /100 WBCS
NT-PROBNP SERPL-MCNC: 734 PG/ML
PLATELET # BLD AUTO: 177 THOUSANDS/UL (ref 149–390)
PMV BLD AUTO: 10.5 FL (ref 8.9–12.7)
POTASSIUM SERPL-SCNC: 4.2 MMOL/L (ref 3.5–5.3)
RBC # BLD AUTO: 5.98 MILLION/UL (ref 3.88–5.62)
SODIUM SERPL-SCNC: 139 MMOL/L (ref 136–145)
WBC # BLD AUTO: 11.75 THOUSAND/UL (ref 4.31–10.16)

## 2018-09-07 PROCEDURE — 80048 BASIC METABOLIC PNL TOTAL CA: CPT

## 2018-09-07 PROCEDURE — 83880 ASSAY OF NATRIURETIC PEPTIDE: CPT

## 2018-09-07 PROCEDURE — 36415 COLL VENOUS BLD VENIPUNCTURE: CPT

## 2018-09-07 PROCEDURE — 85025 COMPLETE CBC W/AUTO DIFF WBC: CPT

## 2018-09-24 DIAGNOSIS — I48.92 PAROXYSMAL ATRIAL FLUTTER (HCC): ICD-10-CM

## 2018-10-17 ENCOUNTER — OFFICE VISIT (OUTPATIENT)
Dept: CARDIOLOGY CLINIC | Facility: CLINIC | Age: 83
End: 2018-10-17
Payer: COMMERCIAL

## 2018-10-17 VITALS
DIASTOLIC BLOOD PRESSURE: 72 MMHG | WEIGHT: 158 LBS | HEIGHT: 66 IN | SYSTOLIC BLOOD PRESSURE: 130 MMHG | HEART RATE: 88 BPM | BODY MASS INDEX: 25.39 KG/M2

## 2018-10-17 DIAGNOSIS — F41.9 ANXIETY: ICD-10-CM

## 2018-10-17 DIAGNOSIS — I49.5 SICK SINUS SYNDROME (HCC): ICD-10-CM

## 2018-10-17 DIAGNOSIS — I05.9 MITRAL VALVE DISEASE: ICD-10-CM

## 2018-10-17 DIAGNOSIS — I35.9 AORTIC VALVE DISEASE: ICD-10-CM

## 2018-10-17 DIAGNOSIS — I48.92 PAROXYSMAL ATRIAL FLUTTER (HCC): Primary | ICD-10-CM

## 2018-10-17 PROCEDURE — 93000 ELECTROCARDIOGRAM COMPLETE: CPT | Performed by: INTERNAL MEDICINE

## 2018-10-17 PROCEDURE — 99214 OFFICE O/P EST MOD 30 MIN: CPT | Performed by: INTERNAL MEDICINE

## 2018-10-17 RX ORDER — LORAZEPAM 0.5 MG/1
0.5 TABLET ORAL DAILY PRN
Qty: 90 TABLET | Refills: 1 | Status: SHIPPED | OUTPATIENT
Start: 2018-10-17 | End: 2019-05-03 | Stop reason: SDUPTHER

## 2018-10-17 NOTE — PROGRESS NOTES
Cardiology Follow Up    Dorina Singh  8/9/1926  [id number]  [address]  [address] [address] [phone]    Reason for visit: 6 week FU for PA flutter  Known MV/AV disease s/p bio AVR/MVR in 2013  Precious Freeman Also has SSS with pacer in situ  Precious Freeman Has HTN and HLP    1  Paroxysmal atrial flutter (HCC)  POCT ECG   2  Aortic valve disease     3  Mitral valve disease     4  Sick sinus syndrome (HCC)         Interval History:   Since the patients last visit he denies palpitations  He does get some dizziness  He does have some PENA  He denies edema  He denies CP    Patient Active Problem List   Diagnosis    Sick sinus syndrome (HCC)    Aortic valve disease    Mitral valve disease    Paroxysmal atrial flutter (HCC)    PS[facility]    Benign prostatic hyperplasia without lower urinary tract symptoms    Gastroesophageal reflux disease without esophagitis    Other fatigue    PENA (dyspnea on exertion)     Past Medical History:   Diagnosis Date    [facility] ([facility] )     Thoracic aortic aneurysm ([facility] )      Social History     Social History    Marital status: /Civil Union     Spouse name: N/A    Number of children: N/A    Years of education: N/A     Occupational History    Not on file       Social History Main Topics    Smoking status: Former Smoker    Smokeless tobacco: Never Used      Comment: quit in 1970s    Alcohol use No    Drug use: No    Sexual activity: Not on file     Other Topics Concern    Not on file     Social History Narrative    No narrative on file      Family History   Problem Relation Age of Onset    Heart disease Family      Past Surgical History:   Procedure Laterality Date    AORTIC VALVE REPLACEMENT      CARDIAC PACEMAKER PLACEMENT      CATARACT EXTRACTION      MITRAL VALVE REPLACEMENT      SHOULDER SURGERY      THORACIC AORTIC ANEURYSM REPAIR Current Outpatient Prescriptions:     allopurinol (ZYLOPRIM) 300 mg tablet, Take by mouth, Disp: , Rfl:     amiodarone 200 mg tablet, Take 1 tablet (200 mg total) by mouth see administration instructions One tablet BID for one week then one tablet daily, Disp: 97 tablet, Rfl: 0    amoxicillin (AMOXIL) 500 mg capsule, TAKE 4 CAPSULES BY MOUTH 1 HOUR PRIOR TO APPOINTMENT, Disp: , Rfl:     apixaban (ELIQUIS) 2 5 mg, Take 1 tablet (2 5 mg total) by mouth 2 (two) times a day, Disp: 60 tablet, Rfl: 5    doxazosin (CARDURA) 2 mg tablet, Take 1 mg by mouth daily at bedtime , Disp: , Rfl:     famotidine (PEPCID) 10 mg tablet, Take 1 tablet by mouth, Disp: , Rfl:     Fluticasone Propionate, Inhal, (FLOVENT DISKUS IN), Inhale, Disp: , Rfl:     glucosamine-chondroitin (COSAMIN DS) 500-400 MG tablet, Take by mouth, Disp: , Rfl:     LORazepam (ATIVAN) 0 5 mg tablet, TAKE 1 TABLET TWICE A DAY, Disp: , Rfl:     metoprolol succinate (TOPROL-XL) 50 mg 24 hr tablet, Take 0 5 tablets (25 mg total) by mouth 2 (two) times a day, Disp: 180 tablet, Rfl: 0    Multiple Vitamin (MULTI-VITAMIN DAILY) TABS, Take 1 tablet by mouth daily, Disp: , Rfl:     Omega-3 Fatty Acids (FISH OIL) 1,000 mg, Take 1 capsule by mouth 2 (two) times a day, Disp: , Rfl:     sertraline (ZOLOFT) 25 mg tablet, Take 25 mg by mouth, Disp: , Rfl:     simvastatin (ZOCOR) 20 mg tablet, Take 1 tablet by mouth, Disp: , Rfl:   No Known Allergies    Review of Systems:  Review of Systems   Constitutional: Positive for fatigue  Negative for activity change, appetite change and unexpected weight change  HENT: Positive for hearing loss  Respiratory: Positive for shortness of breath  Negative for cough, chest tightness and wheezing  Cardiovascular: Negative for chest pain, palpitations and leg swelling  Gastrointestinal: Negative for abdominal distention, blood in stool and diarrhea  Genitourinary: Positive for frequency   Negative for dysuria, hematuria and urgency  Musculoskeletal: Positive for arthralgias and back pain  Negative for gait problem and joint swelling  Neurological: Positive for dizziness and light-headedness  Negative for seizures, speech difficulty and headaches  Psychiatric/Behavioral: Negative for agitation, behavioral problems, confusion and decreased concentration  Physical Exam:  Vitals:    10/17/18 1339   BP: 130/72   BP Location: Right arm   Patient Position: Sitting   Cuff Size: Adult   Pulse: 88   Weight: 71 7 kg (158 lb)   Height: 5' 6" (1 676 m)       Physical Exam   Constitutional: He is oriented to person, place, and time  He appears well-developed and well-nourished  No distress  HENT:   Head: Normocephalic and atraumatic  Mouth/Throat: No oropharyngeal exudate  Eyes: No scleral icterus  Conjunctiva pale   Neck: Neck supple  Normal carotid pulses and no JVD present  Carotid bruit is not present  No thyromegaly present  Cardiovascular: Regular rhythm  Exam reveals no gallop and no friction rub  Murmur heard  Crescendo decrescendo systolic (basal) murmur is present with a grade of 2/6    No diastolic murmur is present   Pulses:       Dorsalis pedis pulses are 1+ on the right side, and 1+ on the left side  Posterior tibial pulses are 2+ on the right side, and 2+ on the left side  Pulmonary/Chest: Breath sounds normal  He has no decreased breath sounds  He has no wheezes  He has no rhonchi  He has no rales  Abdominal: Soft  He exhibits no mass  There is no hepatosplenomegaly  There is no tenderness  Musculoskeletal: He exhibits edema (trace in LEs) and deformity (kyphosis)  He exhibits no tenderness  Neurological: He is oriented to person, place, and time  He has normal strength  No cranial nerve deficit or sensory deficit  Skin: Skin is warm and dry  No rash noted  No erythema  No pallor  Psychiatric: He has a normal mood and affect   His behavior is normal  Judgment and thought content normal        Discussion/Summary:  1  Paroxysmal atrial flutter  Appears to be maintaining sinus rhythm on amiodarone  On metoprolol for potential rate control  Does have some dyspnea but I doubt there is any evidence for congestive heart failure based on exam   2 /3  Aortic and mitral valve disease status post bioprosthetic aortic and mitral valve replacement 2013  No evidence for malfunction prostheses  4  Sick sinus syndrome with pacemaker in situ-normally functioning device    Device check next month to see efficacy of amiodarone  5  HLP-on simvastatin  Gerhardt Sep  in June    No CAD  Gerhardt Sep Continue same dosage      FU 2 month-if ok will consider reducing amiodarone to 100 mg daily at that point        Humberto Bauer MD

## 2018-11-06 ENCOUNTER — REMOTE DEVICE CLINIC VISIT (OUTPATIENT)
Dept: CARDIOLOGY CLINIC | Facility: CLINIC | Age: 83
End: 2018-11-06
Payer: COMMERCIAL

## 2018-11-06 DIAGNOSIS — I47.1 PSVT (PAROXYSMAL SUPRAVENTRICULAR TACHYCARDIA) (HCC): ICD-10-CM

## 2018-11-06 DIAGNOSIS — I48.0 PAROXYSMAL ATRIAL FIBRILLATION (HCC): ICD-10-CM

## 2018-11-06 DIAGNOSIS — I49.5 SICK SINUS SYNDROME (HCC): Primary | ICD-10-CM

## 2018-11-06 DIAGNOSIS — Z95.0 CARDIAC PACEMAKER: ICD-10-CM

## 2018-11-06 PROCEDURE — 93294 REM INTERROG EVL PM/LDLS PM: CPT | Performed by: INTERNAL MEDICINE

## 2018-11-06 PROCEDURE — 93296 REM INTERROG EVL PM/IDS: CPT | Performed by: INTERNAL MEDICINE

## 2018-11-08 ENCOUNTER — TELEPHONE (OUTPATIENT)
Dept: CARDIOLOGY CLINIC | Facility: CLINIC | Age: 83
End: 2018-11-08

## 2018-11-08 DIAGNOSIS — I48.92 PAROXYSMAL ATRIAL FLUTTER (HCC): ICD-10-CM

## 2018-11-08 RX ORDER — AMIODARONE HYDROCHLORIDE 200 MG/1
200 TABLET ORAL SEE ADMIN INSTRUCTIONS
Qty: 30 TABLET | Refills: 5 | Status: SHIPPED | OUTPATIENT
Start: 2018-11-08

## 2018-11-08 NOTE — TELEPHONE ENCOUNTER
Phone call from patient's wife rTa Santana who is concerned about Felicita Gandhi and his weakness and fatigue  She was looking in the my chart portal and "has questions about his device report that may shed some light on these symptoms " She doesn't understand how to read the report and did not want to call the pacer clinic for details  She is requesting a phone call directly from Dr Bridges [facility]   [phone]

## 2018-11-10 DIAGNOSIS — D64.9 ANEMIA, UNSPECIFIED TYPE: Primary | ICD-10-CM

## 2018-11-10 DIAGNOSIS — I48.92 PAROXYSMAL ATRIAL FLUTTER (HCC): ICD-10-CM

## 2018-11-10 DIAGNOSIS — R53.83 FATIGUE, UNSPECIFIED TYPE: ICD-10-CM

## 2018-11-10 RX ORDER — METOPROLOL SUCCINATE 50 MG/1
25 TABLET, EXTENDED RELEASE ORAL DAILY
Qty: 180 TABLET | Refills: 0
Start: 2018-11-10 | End: 2018-12-21 | Stop reason: SDUPTHER

## 2018-11-10 NOTE — TELEPHONE ENCOUNTER
Called wife    Had dr Nasrin Foster review    Rate looks well controlled    Will decrease metoprolol to OD to see if this helps     Also check BMP, CBC to see obvious cause of fatigue

## 2018-11-19 DIAGNOSIS — I48.92 PAROXYSMAL ATRIAL FLUTTER (HCC): ICD-10-CM

## 2018-11-20 RX ORDER — AMIODARONE HYDROCHLORIDE 200 MG/1
TABLET ORAL
Qty: 90 TABLET | Refills: 1 | Status: SHIPPED | OUTPATIENT
Start: 2018-11-20 | End: 2018-12-21 | Stop reason: SDUPTHER

## 2018-12-21 ENCOUNTER — OFFICE VISIT (OUTPATIENT)
Dept: CARDIOLOGY CLINIC | Facility: CLINIC | Age: 83
End: 2018-12-21
Payer: COMMERCIAL

## 2018-12-21 VITALS
WEIGHT: 155.4 LBS | HEIGHT: 66 IN | SYSTOLIC BLOOD PRESSURE: 122 MMHG | BODY MASS INDEX: 24.98 KG/M2 | DIASTOLIC BLOOD PRESSURE: 60 MMHG | HEART RATE: 71 BPM

## 2018-12-21 DIAGNOSIS — I35.9 AORTIC VALVE DISEASE: ICD-10-CM

## 2018-12-21 DIAGNOSIS — I48.92 PAROXYSMAL ATRIAL FLUTTER (HCC): Primary | ICD-10-CM

## 2018-12-21 DIAGNOSIS — I05.9 MITRAL VALVE DISEASE: ICD-10-CM

## 2018-12-21 DIAGNOSIS — D64.9 ANEMIA, UNSPECIFIED TYPE: ICD-10-CM

## 2018-12-21 DIAGNOSIS — I49.5 SICK SINUS SYNDROME (HCC): ICD-10-CM

## 2018-12-21 PROCEDURE — 99214 OFFICE O/P EST MOD 30 MIN: CPT | Performed by: INTERNAL MEDICINE

## 2018-12-21 PROCEDURE — 93000 ELECTROCARDIOGRAM COMPLETE: CPT | Performed by: INTERNAL MEDICINE

## 2018-12-21 RX ORDER — AMIODARONE HYDROCHLORIDE 200 MG/1
200 TABLET ORAL EVERY OTHER DAY
Qty: 45 TABLET | Refills: 3
Start: 2018-12-21

## 2018-12-21 RX ORDER — METOPROLOL SUCCINATE 25 MG/1
25 TABLET, EXTENDED RELEASE ORAL 2 TIMES DAILY
Start: 2018-12-21 | End: 2019-05-03 | Stop reason: SDUPTHER

## 2018-12-21 RX ORDER — OSELTAMIVIR PHOSPHATE 75 MG/1
75 CAPSULE ORAL
COMMUNITY
Start: 2018-12-18 | End: 2018-12-28

## 2018-12-21 NOTE — PROGRESS NOTES
Cardiology Follow Up    Kasie Wilson  8/9/1926  [id number]  [address]  [address] [address] [phone]    Reason for visit: 2 month FU for PA flutter  Known MV/AV disease s/p bio AVR/MVR in 2013  Riki Side Also has SSS with pacer in situ  [place] Side Has HTN and HLP       1  Paroxysmal atrial flutter (HCC)  POCT ECG   2  Sick sinus syndrome ([facility] [address] )     3  Aortic valve disease     4  Mitral valve disease         Interval History: He did have sustained high HR for 3 days in November  [facility] wife increased his beta blocker to twice daily  This did break after 3 days    He has ongoing fatigue  He sleeps a lot  Riki Side He denies cough  He does get some PENA  [facility] He denies CP  He denies palpitations  He denies edema  Patient Active Problem List   Diagnosis    Sick sinus syndrome (HCC)    Aortic valve disease    Mitral valve disease    Paroxysmal atrial flutter (HCC)    [facility]    Benign prostatic hyperplasia without lower urinary tract symptoms    Gastroesophageal reflux disease without esophagitis    Other fatigue    PENA (dyspnea on exertion)     Past Medical History:   Diagnosis Date    [facility]anoma ([facility] )     Thoracic aortic aneurysm ([facility] )      Social History     Social History    Marital status: /Civil Union     Spouse name: N/A    Number of children: N/A    Years of education: N/A     Occupational History    Not on file       Social History Main Topics    Smoking status: Former Smoker    Smokeless tobacco: Never Used      Comment: quit in 1970s    Alcohol use No    Drug use: No    Sexual activity: Not on file     Other Topics Concern    Not on file     Social History Narrative    No narrative on file      Family History   Problem Relation Age of Onset    Heart disease Family      Past Surgical History:   Procedure Laterality Date    AORTIC VALVE REPLACEMENT      CARDIAC PACEMAKER PLACEMENT      CATARACT EXTRACTION      MITRAL VALVE REPLACEMENT      SHOULDER SURGERY      THORACIC AORTIC ANEURYSM REPAIR         Current Outpatient Prescriptions:     allopurinol (ZYLOPRIM) 300 mg tablet, Take by mouth, Disp: , Rfl:     amiodarone 200 mg tablet, Take 1 tablet (200 mg total) by mouth see administration instructions One tablet BID for one week then one tablet daily, Disp: 30 tablet, Rfl: 5    apixaban (ELIQUIS) 2 5 mg, Take 1 tablet (2 5 mg total) by mouth 2 (two) times a day, Disp: 60 tablet, Rfl: 5    doxazosin (CARDURA) 2 mg tablet, Take 1 mg by mouth daily at bedtime , Disp: , Rfl:     famotidine (PEPCID) 10 mg tablet, Take 1 tablet by mouth, Disp: , Rfl:     Fluticasone Propionate, Inhal, (FLOVENT DISKUS IN), Inhale, Disp: , Rfl:     glucosamine-chondroitin (COSAMIN DS) 500-400 MG tablet, Take by mouth, Disp: , Rfl:     LORazepam (ATIVAN) 0 5 mg tablet, TAKE 1 TABLET TWICE A DAY, Disp: , Rfl:     metoprolol succinate (TOPROL-XL) 50 mg 24 hr tablet, Take 0 5 tablets (25 mg total) by mouth daily (Patient taking differently: Take 25 mg by mouth 2 (two) times a day  ), Disp: 180 tablet, Rfl: 0    Multiple Vitamin (MULTI-VITAMIN DAILY) TABS, Take 1 tablet by mouth daily, Disp: , Rfl:     oseltamivir (TAMIFLU) 75 mg capsule, Take 75 mg by mouth, Disp: , Rfl:     sertraline (ZOLOFT) 25 mg tablet, Take 25 mg by mouth, Disp: , Rfl:     simvastatin (ZOCOR) 20 mg tablet, Take 1 tablet by mouth, Disp: , Rfl:     amiodarone 200 mg tablet, TAKE 1 TABLET BY MOUTH TWICE A DAY FOR ONE WEEK, THEN ONE TABLET DAILY   (Patient not taking: Reported on 12/21/2018), Disp: 90 tablet, Rfl: 1    amoxicillin (AMOXIL) 500 mg capsule, TAKE 4 CAPSULES BY MOUTH 1 HOUR PRIOR TO APPOINTMENT, Disp: , Rfl:     LORazepam (ATIVAN) 0 5 mg tablet, Take 1 tablet (0 5 mg total) by mouth daily as needed for anxiety (Patient not taking: Reported on 12/21/2018 ), Disp: 90 tablet, Rfl: 1    Omega-3 Fatty Acids (FISH OIL) 1,000 mg, Take 1 capsule by mouth 2 (two) times a day, Disp: , Rfl:   No Known Allergies      Review of Systems:  Review of Systems   Constitutional: Positive for fatigue  Negative for activity change, appetite change and unexpected weight change  HENT: Positive for hearing loss  Respiratory: Positive for shortness of breath  Negative for cough, chest tightness and wheezing  Cardiovascular: Negative for chest pain, palpitations and leg swelling  Gastrointestinal: Negative for abdominal distention, blood in stool and diarrhea  Genitourinary: Positive for frequency  Negative for dysuria, hematuria and urgency  Musculoskeletal: Negative for arthralgias, back pain, gait problem and joint swelling  Neurological: Negative for dizziness, seizures, speech difficulty, light-headedness and headaches  Psychiatric/Behavioral: Negative for agitation, behavioral problems, confusion and decreased concentration  Physical Exam:  Vitals:    12/21/18 0912   BP: 122/60   BP Location: Right arm   Patient Position: Sitting   Cuff Size: Standard   Pulse: 71   Weight: 70 5 kg (155 lb 6 4 oz)   Height: 5' 6" (1 676 m)       Physical Exam   Constitutional: He is oriented to person, place, and time  He appears well-developed and well-nourished  No distress  HENT:   Head: Normocephalic and atraumatic  Mouth/Throat: No oropharyngeal exudate  Eyes: No scleral icterus  Conjunctiva pale   Neck: Neck supple  Normal carotid pulses and no JVD present  Carotid bruit is not present  No thyromegaly present  Cardiovascular: Regular rhythm  Exam reveals no gallop and no friction rub  Murmur heard  Crescendo decrescendo systolic (basal) murmur is present with a grade of 2/6    No diastolic murmur is present   Pulses:       Dorsalis pedis pulses are 1+ on the right side, and 1+ on the left side  Posterior tibial pulses are 2+ on the right side, and 2+ on the left side     Pulmonary/Chest: Breath sounds normal  He has no decreased breath sounds  He has no wheezes  He has no rhonchi  He has no rales  Abdominal: Soft  He exhibits no mass  There is no hepatosplenomegaly  There is no tenderness  Musculoskeletal: He exhibits deformity (kyphosis)  He exhibits no edema or tenderness  Neurological: He is oriented to person, place, and time  He has normal strength  No cranial nerve deficit or sensory deficit  Skin: Skin is warm and dry  No rash noted  No erythema  No pallor  Psychiatric: He has a normal mood and affect  His behavior is normal  Judgment and thought content normal             Discussion/Summary:  1  Paroxysmal Atrial flutter    In NSR  Lajean Cassette Likely had aflutter in late November  Doing well overall    Would do EP study with a flutter ablation (if easy to do) or AV isreal ablation (if flutter not easily fixed) if patient has much breakthrough aflutter    Will reduce amiodarone to 200 mg QOD  Lajean Cassette Continue beta blocker at 25 mg BID  Continue eliquis 2 5 mg BID  2  MV/AV disease s/p MVR/AVR in 2013-normal valves on exam  3  SSS with pacer in situ    Normally functioning device  4  HLP-on statin rx  Lajean Cassette LDL low 100s    Acceptable considering he has no overt vascular disease    Hb drifted down a bit    On eliquis 2 5 BID    (probably underdosed with current creat value but hesitant to increase with somewhat worse anemia  Check TSH in March with Hb in light of amiodarone rx      Andrea Mcdonald MD

## 2019-01-09 DIAGNOSIS — E78.5 HYPERLIPIDEMIA, UNSPECIFIED HYPERLIPIDEMIA TYPE: Primary | ICD-10-CM

## 2019-01-09 DIAGNOSIS — E78.2 MIXED HYPERLIPIDEMIA: Primary | ICD-10-CM

## 2019-01-09 RX ORDER — EZETIMIBE AND SIMVASTATIN 10; 40 MG/1; MG/1
1 TABLET ORAL
Qty: 30 TABLET | Refills: 4 | OUTPATIENT
Start: 2019-01-09

## 2019-01-09 RX ORDER — SIMVASTATIN 20 MG
20 TABLET ORAL
Qty: 30 TABLET | Refills: 5 | Status: SHIPPED | OUTPATIENT
Start: 2019-01-09 | End: 2019-05-03 | Stop reason: SDUPTHER

## 2019-02-06 ENCOUNTER — REMOTE DEVICE CLINIC VISIT (OUTPATIENT)
Dept: CARDIOLOGY CLINIC | Facility: CLINIC | Age: 84
End: 2019-02-06
Payer: COMMERCIAL

## 2019-02-06 DIAGNOSIS — I48.0 PAF (PAROXYSMAL ATRIAL FIBRILLATION) (HCC): Primary | ICD-10-CM

## 2019-02-06 DIAGNOSIS — Z95.0 CARDIAC PACEMAKER IN SITU: ICD-10-CM

## 2019-02-06 PROCEDURE — 93296 REM INTERROG EVL PM/IDS: CPT | Performed by: INTERNAL MEDICINE

## 2019-02-06 PROCEDURE — 93294 REM INTERROG EVL PM/LDLS PM: CPT | Performed by: INTERNAL MEDICINE

## 2019-02-08 NOTE — PROGRESS NOTES
Results for orders placed or performed in visit on 02/06/19   Cardiac EP device report    Narrative    SJM DDD PPM  MERLIN TRANSMISSION: BATTERY STATUS "OK"  AP 1 5%  11%  ALL LEAD PARAMETERS WITHIN NORMAL LIMITS  114 AMS NOTED, NO AFIB  PT ON ELIQUIS, AMIO, & METO SUCC  0% BURDEN  NORMAL DEVICE FUNCTION   NC

## 2019-04-05 DIAGNOSIS — I48.0 PAF (PAROXYSMAL ATRIAL FIBRILLATION) (HCC): Primary | ICD-10-CM

## 2019-04-05 DIAGNOSIS — I48.92 PAROXYSMAL ATRIAL FLUTTER (HCC): ICD-10-CM

## 2019-05-02 PROBLEM — E78.2 MIXED HYPERLIPIDEMIA: Status: ACTIVE | Noted: 2019-05-02

## 2019-05-03 ENCOUNTER — OFFICE VISIT (OUTPATIENT)
Dept: CARDIOLOGY CLINIC | Facility: CLINIC | Age: 84
End: 2019-05-03
Payer: COMMERCIAL

## 2019-05-03 VITALS
BODY MASS INDEX: 24.43 KG/M2 | SYSTOLIC BLOOD PRESSURE: 124 MMHG | DIASTOLIC BLOOD PRESSURE: 80 MMHG | OXYGEN SATURATION: 99 % | HEART RATE: 75 BPM | HEIGHT: 66 IN | WEIGHT: 152 LBS

## 2019-05-03 DIAGNOSIS — I05.9 MITRAL VALVE DISEASE: ICD-10-CM

## 2019-05-03 DIAGNOSIS — I35.9 AORTIC VALVE DISEASE: ICD-10-CM

## 2019-05-03 DIAGNOSIS — I49.5 SICK SINUS SYNDROME (HCC): ICD-10-CM

## 2019-05-03 DIAGNOSIS — I48.92 PAROXYSMAL ATRIAL FLUTTER (HCC): Primary | ICD-10-CM

## 2019-05-03 DIAGNOSIS — I47.1 PSVT (PAROXYSMAL SUPRAVENTRICULAR TACHYCARDIA) (HCC): ICD-10-CM

## 2019-05-03 DIAGNOSIS — E78.2 MIXED HYPERLIPIDEMIA: ICD-10-CM

## 2019-05-03 DIAGNOSIS — F41.9 ANXIETY: ICD-10-CM

## 2019-05-03 PROCEDURE — 93000 ELECTROCARDIOGRAM COMPLETE: CPT | Performed by: INTERNAL MEDICINE

## 2019-05-03 PROCEDURE — 99214 OFFICE O/P EST MOD 30 MIN: CPT | Performed by: INTERNAL MEDICINE

## 2019-05-03 RX ORDER — METOPROLOL SUCCINATE 25 MG/1
25 TABLET, EXTENDED RELEASE ORAL 2 TIMES DAILY
Qty: 180 TABLET | Refills: 3 | Status: SHIPPED | OUTPATIENT
Start: 2019-05-03 | End: 2019-10-21 | Stop reason: SDUPTHER

## 2019-05-03 RX ORDER — SIMVASTATIN 20 MG
20 TABLET ORAL
Qty: 90 TABLET | Refills: 3 | Status: SHIPPED | OUTPATIENT
Start: 2019-05-03 | End: 2019-10-21 | Stop reason: SDUPTHER

## 2019-05-03 RX ORDER — LORAZEPAM 0.5 MG/1
0.5 TABLET ORAL DAILY PRN
Qty: 90 TABLET | Refills: 1 | Status: SHIPPED | OUTPATIENT
Start: 2019-05-03 | End: 2019-09-18 | Stop reason: SDUPTHER

## 2019-08-02 ENCOUNTER — IN-CLINIC DEVICE VISIT (OUTPATIENT)
Dept: CARDIOLOGY CLINIC | Facility: CLINIC | Age: 84
End: 2019-08-02
Payer: COMMERCIAL

## 2019-08-02 DIAGNOSIS — I47.1 PSVT (PAROXYSMAL SUPRAVENTRICULAR TACHYCARDIA) (HCC): ICD-10-CM

## 2019-08-02 DIAGNOSIS — I48.0 PAROXYSMAL ATRIAL FIBRILLATION (HCC): ICD-10-CM

## 2019-08-02 DIAGNOSIS — Z95.0 CARDIAC PACEMAKER IN SITU: ICD-10-CM

## 2019-08-02 DIAGNOSIS — I49.5 SICK SINUS SYNDROME (HCC): Primary | ICD-10-CM

## 2019-08-02 PROCEDURE — 93280 PM DEVICE PROGR EVAL DUAL: CPT | Performed by: INTERNAL MEDICINE

## 2019-08-05 NOTE — PROGRESS NOTES
Results for orders placed or performed in visit on 08/02/19   Cardiac EP device report    Narrative    SJM DDD PPM  DEVICE INTERROGATED IN THE [place] OFFICE  BATTERY VOLTAGE ADEQUATE (7 0-8 5 YRS)  AP - 3%  -13%  ALL LEAD PARAMETERS WITHIN NORMAL LIMITS  ALL OTHER TESTING WITHIN NORMAL LIMITS  [address], ALL DURATIONS <1 MIN  ALL EGRMS AVAIL SHOWING COMP  ATRIAL PACING, PAT, FF O/S  TOTAL AT/AF BURDEN <1%  HX: PAF/PAFL & PT TAKES ELIQUIS, AMIODORONE, METOPROLOL SUCC  NO PROGRAMMING CHANGES MADE TO DEVICE PARAMETERS  PACEMAKER FUNCTIONING APPROPRIATELY      EB

## 2019-09-18 DIAGNOSIS — F41.9 ANXIETY: ICD-10-CM

## 2019-09-18 RX ORDER — LORAZEPAM 0.5 MG/1
0.5 TABLET ORAL DAILY PRN
Qty: 90 TABLET | Refills: 1 | Status: SHIPPED | OUTPATIENT
Start: 2019-09-18

## 2019-09-26 DIAGNOSIS — I48.92 PAROXYSMAL ATRIAL FLUTTER (HCC): Primary | ICD-10-CM

## 2019-10-21 DIAGNOSIS — I48.92 PAROXYSMAL ATRIAL FLUTTER (HCC): ICD-10-CM

## 2019-10-21 DIAGNOSIS — E78.2 MIXED HYPERLIPIDEMIA: ICD-10-CM

## 2019-10-21 RX ORDER — METOPROLOL SUCCINATE 25 MG/1
25 TABLET, EXTENDED RELEASE ORAL 2 TIMES DAILY
Qty: 180 TABLET | Refills: 3 | Status: SHIPPED | OUTPATIENT
Start: 2019-10-21

## 2019-10-21 RX ORDER — SIMVASTATIN 20 MG
20 TABLET ORAL
Qty: 90 TABLET | Refills: 3 | Status: SHIPPED | OUTPATIENT
Start: 2019-10-21

## 2019-10-21 NOTE — TELEPHONE ENCOUNTER
Mr Tushar Chapin pharmacy [facility] mail Service is requesting for the patient's Simvastatin 20MG and Metoprolol 25MG  The patient is a patient of Dr John Hernández and was seen in May 2019

## 2019-11-06 ENCOUNTER — REMOTE DEVICE CLINIC VISIT (OUTPATIENT)
Dept: CARDIOLOGY CLINIC | Facility: CLINIC | Age: 84
End: 2019-11-06
Payer: COMMERCIAL

## 2019-11-06 DIAGNOSIS — Z95.0 PACEMAKER: Primary | ICD-10-CM

## 2019-11-06 PROCEDURE — 93294 REM INTERROG EVL PM/LDLS PM: CPT | Performed by: INTERNAL MEDICINE

## 2019-11-06 PROCEDURE — 93296 REM INTERROG EVL PM/IDS: CPT | Performed by: INTERNAL MEDICINE

## 2019-11-06 NOTE — PROGRESS NOTES
Results for orders placed or performed in visit on 11/06/19   Cardiac EP device report    Narrative    SJM-DUAL CHAMBER PPM (DDD MODE)  MERLIN TRANSMISSION: BATTERY ADEQUATE (5 9 YRS)  ALL LEAD PARAMETERS WITHIN NORMAL LIMITS  NO SIGNIFICANT HIGH RATE EPISODES  NORMAL DEVICE FUNCTION   PL

## 2019-11-15 ENCOUNTER — OFFICE VISIT (OUTPATIENT)
Dept: CARDIOLOGY CLINIC | Facility: CLINIC | Age: 84
End: 2019-11-15
Payer: COMMERCIAL

## 2019-11-15 VITALS
SYSTOLIC BLOOD PRESSURE: 130 MMHG | WEIGHT: 153 LBS | DIASTOLIC BLOOD PRESSURE: 80 MMHG | BODY MASS INDEX: 24.59 KG/M2 | HEART RATE: 72 BPM | HEIGHT: 66 IN

## 2019-11-15 DIAGNOSIS — I05.9 MITRAL VALVE DISEASE: ICD-10-CM

## 2019-11-15 DIAGNOSIS — I48.92 PAROXYSMAL ATRIAL FLUTTER (HCC): Primary | ICD-10-CM

## 2019-11-15 DIAGNOSIS — I35.9 AORTIC VALVE DISEASE: ICD-10-CM

## 2019-11-15 DIAGNOSIS — E78.2 MIXED HYPERLIPIDEMIA: ICD-10-CM

## 2019-11-15 DIAGNOSIS — I49.5 SICK SINUS SYNDROME (HCC): ICD-10-CM

## 2019-11-15 PROCEDURE — 99214 OFFICE O/P EST MOD 30 MIN: CPT | Performed by: INTERNAL MEDICINE

## 2019-11-15 NOTE — PROGRESS NOTES
Cardiology Follow Up    José Manuel Blanchard  8/9/1926  [id number]  [address] [place] [id number] [phone]    Reason for visit: 10 m ont FU for PA flutter  Known MV/AV disease s/p bio AVR/MVR in 2013  Wiley Cao Also has SSS with pacer in situ  Wiley Cao Has  HLP       1  Paroxysmal atrial flutter ([address] )     2  Aortic valve disease     3  Mitral valve disease     4  Sick sinus syndrome ([facility] )     5  Mixed hyperlipidemia         Interval History:  Since his last visit he has going PENA but it is perhaps a bit better  He did have fatigue this morning which is getter better  His fatigue is improved  He denies edema  He denies palpitations or chest pain  He denies edema  He does get some lightheadedness at times       Patient Active Problem List   Diagnosis    Sick sinus syndrome (HCC)    Aortic valve disease    Mitral valve disease    Paroxysmal atrial flutter (HCC)    PSVT (paroxysmal supraventricular tachycardia) (HCC)    Benign prostatic hyperplasia without lower urinary tract symptoms    Gastroesophageal reflux disease without esophagitis    Other fatigue    PENA (dyspnea on exertion)    Mixed hyperlipidemia     Past Medical History:   Diagnosis Date    Melanoma ([facility] )     Thoracic aortic aneurysm (HCC)      Social History     Socioeconomic History    Marital status: /Civil Union     Spouse name: Not on file    Number of children: Not on file    Years of education: Not on file    Highest education level: Not on file   Occupational History    Not on file   Social Needs    Financial resource strain: Not on file    Food insecurity:     Worry: Not on file     Inability: Not on file    Transportation needs:     Medical: Not on file     Non-medical: Not on file   Tobacco Use    Smoking status: Former Smoker    Smokeless tobacco: Never Used    Tobacco comment: quit in 1970s   Substance and Sexual Activity    Alcohol use: No    Drug use: No    Sexual activity: Not on file   Lifestyle    Physical activity:     Days per week: Not on file     Minutes per session: Not on file    Stress: Not on file   Relationships    Social connections:     Talks on phone: Not on file     Gets together: Not on file     Attends Zoroastrianism service: Not on file     Active member of club or organization: Not on file     Attends meetings of clubs or organizations: Not on file     Relationship status: Not on file    Intimate partner violence:     Fear of current or ex partner: Not on file     Emotionally abused: Not on file     Physically abused: Not on file     Forced sexual activity: Not on file   Other Topics Concern    Not on file   Social History Narrative    Not on file      Family History   Problem Relation Age of Onset    Heart disease Family      Past Surgical History:   Procedure Laterality Date    AORTIC VALVE REPLACEMENT      CARDIAC PACEMAKER PLACEMENT      CATARACT EXTRACTION      MITRAL VALVE REPLACEMENT      SHOULDER SURGERY      THORACIC AORTIC ANEURYSM REPAIR         Current Outpatient Medications:     allopurinol (ZYLOPRIM) 300 mg tablet, Take by mouth 3 (three) times a week , Disp: , Rfl:     amiodarone 200 mg tablet, Take 1 tablet (200 mg total) by mouth every other day, Disp: 45 tablet, Rfl: 3    amoxicillin (AMOXIL) 500 mg capsule, TAKE 4 CAPSULES BY MOUTH 1 HOUR PRIOR TO APPOINTMENT, Disp: , Rfl:     apixaban (ELIQUIS) 2 5 mg, Take 1 tablet (2 5 mg total) by mouth 2 (two) times a day, Disp: 60 tablet, Rfl: 5    doxazosin (CARDURA) 2 mg tablet, Take 1 mg by mouth daily at bedtime , Disp: , Rfl:     famotidine (PEPCID) 10 mg tablet, Take 1 tablet by mouth, Disp: , Rfl:     Fluticasone Propionate, Inhal, (FLOVENT DISKUS IN), Inhale, Disp: , Rfl:     glucosamine-chondroitin (COSAMIN DS) 500-400 MG tablet, Take by mouth, Disp: , Rfl:     LORazepam (ATIVAN) 0 5 mg tablet, Take 1 tablet (0 5 mg total) by mouth daily as needed for anxiety, Disp: 90 tablet, Rfl: 1    metoprolol succinate (TOPROL-XL) 25 mg 24 hr tablet, Take 1 tablet (25 mg total) by mouth 2 (two) times a day, Disp: 180 tablet, Rfl: 3    Multiple Vitamin (MULTI-VITAMIN DAILY) TABS, Take 1 tablet by mouth daily, Disp: , Rfl:     sertraline (ZOLOFT) 25 mg tablet, Take 25 mg by mouth, Disp: , Rfl:     simvastatin (ZOCOR) 20 mg tablet, Take 1 tablet (20 mg total) by mouth daily at bedtime, Disp: 90 tablet, Rfl: 3    amiodarone 200 mg tablet, Take 1 tablet (200 mg total) by mouth see administration instructions One tablet BID for one week then one tablet daily (Patient not taking: Reported on 5/3/2019), Disp: 30 tablet, Rfl: 5    LORazepam (ATIVAN) 0 5 mg tablet, TAKE 1 TABLET TWICE A DAY, Disp: , Rfl:     Omega-3 Fatty Acids (FISH OIL) 1,000 mg, Take 1 capsule by mouth 2 (two) times a day, Disp: , Rfl:   No Known Allergies    Review of Systems:  Review of Systems   Constitutional: Positive for fatigue  Negative for activity change, appetite change and unexpected weight change  Respiratory: Positive for shortness of breath  Negative for cough, chest tightness and wheezing  Cardiovascular: Negative for chest pain, palpitations and leg swelling  Gastrointestinal: Negative for abdominal pain, blood in stool, constipation and diarrhea  Genitourinary: Positive for frequency  Negative for dysuria, hematuria and urgency  Musculoskeletal: Positive for arthralgias and gait problem  Negative for back pain, joint swelling and myalgias  Neurological: Positive for dizziness, light-headedness and headaches  Negative for syncope and speech difficulty  Psychiatric/Behavioral: Negative for agitation, behavioral problems, confusion and decreased concentration         Physical Exam:  Vitals:    11/15/19 1105   BP: 130/80   BP Location: Left arm   Patient Position: Sitting   Cuff Size: Adult   Pulse: 72   Weight: 69 4 kg (153 lb)   Height: 5' 6" (1 676 m) Physical Exam   Constitutional: He is oriented to person, place, and time  He appears well-developed and well-nourished  No distress  HENT:   Head: Normocephalic and atraumatic  Mouth/Throat: Oropharynx is clear and moist  No oropharyngeal exudate  Eyes: No scleral icterus  Conjunctiva pale   Neck: Neck supple  Normal carotid pulses and no JVD present  Carotid bruit is not present  No thyromegaly present  Cardiovascular: Normal rate  Occasional extrasystoles are present  Exam reveals no gallop and no friction rub  Murmur heard  Crescendo decrescendo systolic (basal) murmur is present with a grade of 2/6  No diastolic murmur is present  Pulses:       Dorsalis pedis pulses are 1+ on the right side, and 1+ on the left side  Posterior tibial pulses are 1+ on the right side, and 1+ on the left side  Pulmonary/Chest: Breath sounds normal  He has no wheezes  He has no rhonchi  He has no rales  Abdominal: Soft  He exhibits no mass  There is no hepatosplenomegaly  There is no tenderness  Musculoskeletal: He exhibits deformity (kyphosis)  He exhibits no edema or tenderness  Neurological: He is alert and oriented to person, place, and time  He has normal strength  No cranial nerve deficit or sensory deficit  Skin: Skin is warm and dry  No rash noted  No erythema  No pallor  Psychiatric: He has a normal mood and affect  His behavior is normal  Judgment and thought content normal        Discussion/Summary:  1  Paroxysmal atrial flutter  Appears to be maintaining sinus rhythm on amiodarone 200 mg every other day  Will check TSH with upcoming blood work in December and check chest x-ray  Has no evidence for amiodarone induced lung disease clinically  Continue metoprolol for rate control and Eliquis for stroke prophylaxis  Dosage somewhat underdosed but hesitant to increase in light of anemia  2 /3   Aortic and mitral valve disease status post bioprosthetic aortic and mitral valve replacement in 2013  Normally functioning prostheses on exam  4  Sick sinus syndrome with pacemaker in situ  Normally functioning device  5  Hyperlipidemia  Patient on simvastatin LDL cholesterol 104 recently  Continue same    Adequate control considering no overt vascular disease    Fu 6 months      Marsha Garcia MD

## 2020-02-04 ENCOUNTER — REMOTE DEVICE CLINIC VISIT (OUTPATIENT)
Dept: CARDIOLOGY CLINIC | Facility: CLINIC | Age: 85
End: 2020-02-04
Payer: COMMERCIAL

## 2020-02-04 DIAGNOSIS — Z95.0 PRESENCE OF PERMANENT CARDIAC PACEMAKER: Primary | ICD-10-CM

## 2020-02-04 PROCEDURE — 93294 REM INTERROG EVL PM/LDLS PM: CPT | Performed by: INTERNAL MEDICINE

## 2020-02-04 PROCEDURE — 93296 REM INTERROG EVL PM/IDS: CPT | Performed by: INTERNAL MEDICINE

## 2020-02-04 NOTE — PROGRESS NOTES
Results for orders placed or performed in visit on 02/04/20   Cardiac EP device report    Narrative    SJM-DUAL CHAMBER PPM (DDD MODE)  [employer] TRANSMISSION:BATTERY VOLTAGE ADEQUATE  (5 9 YRS)  AP 3%  7%  ALL AVAILABLE LEAD PARAMETERS WITHIN NORMAL LIMITS  [address] < 30 SEC  PATIENT IS ON ELIQUIS, AMIO AND METOPROLOL  NORMAL DEVICE FUNCTION  ----BERRY

## 2020-03-12 ENCOUNTER — TELEPHONE (OUTPATIENT)
Dept: CARDIOLOGY CLINIC | Facility: CLINIC | Age: 85
End: 2020-03-12

## 2020-03-13 NOTE — TELEPHONE ENCOUNTER
Reviewed chart    Did call and LM with wife    Very vague notes   prosthetic valves working fine    Moderate to severe TR with RVE  Darletta Pac ? PNA or diastolic CHF or both    Cardio attributed CHF to prosthetic valves (which are working so I dont buy that explanation)    I Cant  really do much from a distance but told her I will monitor case from [facility] so when discharged I can be up on current events of his sickness

## 2020-03-20 NOTE — TELEPHONE ENCOUNTER
Wife called back again  Surjit Contreras is at home, on hospice, and comfortable  She wants to thank Dr Travis San for his care and all that he has done for him   "It is only a matter of a short time "

## 2020-03-23 ENCOUNTER — TELEPHONE (OUTPATIENT)
Dept: CARDIOLOGY CLINIC | Facility: CLINIC | Age: 85
End: 2020-03-23

## 2020-03-23 NOTE — TELEPHONE ENCOUNTER
Received call from wife stating patient passed away last evening at [facility] [place]   she ask that Dr Trupti Harvey be notified